# Patient Record
Sex: MALE | Race: WHITE | Employment: FULL TIME | URBAN - METROPOLITAN AREA
[De-identification: names, ages, dates, MRNs, and addresses within clinical notes are randomized per-mention and may not be internally consistent; named-entity substitution may affect disease eponyms.]

---

## 2019-08-01 ENCOUNTER — HOSPITAL ENCOUNTER (EMERGENCY)
Age: 29
Discharge: HOME | End: 2019-08-01
Payer: COMMERCIAL

## 2019-08-01 VITALS
HEART RATE: 96 BPM | OXYGEN SATURATION: 100 % | RESPIRATION RATE: 16 BRPM | DIASTOLIC BLOOD PRESSURE: 83 MMHG | WEIGHT: 153 LBS | BODY MASS INDEX: 22.59 KG/M2 | TEMPERATURE: 98.4 F | SYSTOLIC BLOOD PRESSURE: 156 MMHG

## 2019-08-01 DIAGNOSIS — L05.01 PILONIDAL ABSCESS: ICD-10-CM

## 2019-08-01 DIAGNOSIS — Z51.89 VISIT FOR WOUND CHECK: Primary | ICD-10-CM

## 2019-08-01 PROCEDURE — 99212 OFFICE O/P EST SF 10 MIN: CPT

## 2019-08-01 RX ORDER — CLINDAMYCIN HYDROCHLORIDE 150 MG/1
300 CAPSULE ORAL 4 TIMES DAILY
Qty: 56 CAPSULE | Refills: 0 | Status: SHIPPED | OUTPATIENT
Start: 2019-08-01 | End: 2019-08-08

## 2019-08-01 ASSESSMENT — PAIN SCALES - GENERAL: PAINLEVEL_OUTOF10: 2

## 2019-08-01 ASSESSMENT — PAIN DESCRIPTION - LOCATION: LOCATION: COCCYX

## 2019-08-01 NOTE — ED PROVIDER NOTES
------------------------------------------   I have spoken with the patient and discussed todays results, in addition to providing specific details for the plan of care and counseling regarding the diagnosis and prognosis. Their questions are answered at this time and they are agreeable with the plan.      --------------------------------- ADDITIONAL PROVIDER NOTES ---------------------------------      This patient is stable for discharge. I have shared the specific conditions for return, as well as the importance of follow-up. * NOTE: This report was transcribed using voice recognition software. Every effort was made to ensure accuracy; however, inadvertent computerized transcription errors may be present.    --------------------------------- IMPRESSION AND DISPOSITION ---------------------------------    IMPRESSION  1. Visit for wound check    2.  Pilonidal abscess        DISPOSITION  Disposition: Discharge to home  Patient condition is good         Chapin Ambrocio PA-C  08/01/19 0594

## 2019-08-11 ENCOUNTER — HOSPITAL ENCOUNTER (EMERGENCY)
Age: 29
Discharge: HOME | End: 2019-08-11
Payer: COMMERCIAL

## 2019-08-11 VITALS
HEART RATE: 104 BPM | RESPIRATION RATE: 16 BRPM | OXYGEN SATURATION: 100 % | TEMPERATURE: 98 F | WEIGHT: 153 LBS | BODY MASS INDEX: 22.59 KG/M2 | SYSTOLIC BLOOD PRESSURE: 144 MMHG | DIASTOLIC BLOOD PRESSURE: 82 MMHG

## 2019-08-11 DIAGNOSIS — Z88.9 DRUG ALLERGY: Primary | ICD-10-CM

## 2019-08-11 PROCEDURE — 99212 OFFICE O/P EST SF 10 MIN: CPT

## 2019-08-11 PROCEDURE — 6370000000 HC RX 637 (ALT 250 FOR IP): Performed by: PHYSICIAN ASSISTANT

## 2019-08-11 RX ORDER — PREDNISONE 20 MG/1
40 TABLET ORAL ONCE
Status: COMPLETED | OUTPATIENT
Start: 2019-08-11 | End: 2019-08-11

## 2019-08-11 RX ORDER — PREDNISONE 20 MG/1
TABLET ORAL
Qty: 8 TABLET | Refills: 0 | Status: SHIPPED | OUTPATIENT
Start: 2019-08-11 | End: 2019-08-19

## 2019-08-11 RX ORDER — CLINDAMYCIN HYDROCHLORIDE 300 MG/1
300 CAPSULE ORAL 3 TIMES DAILY
COMMUNITY
End: 2019-08-19

## 2019-08-11 RX ORDER — FAMOTIDINE 20 MG/1
20 TABLET, FILM COATED ORAL ONCE
Status: COMPLETED | OUTPATIENT
Start: 2019-08-11 | End: 2019-08-11

## 2019-08-11 RX ADMIN — PREDNISONE 40 MG: 20 TABLET ORAL at 20:21

## 2019-08-11 RX ADMIN — FAMOTIDINE 20 MG: 20 TABLET ORAL at 20:21

## 2019-08-19 ENCOUNTER — OFFICE VISIT (OUTPATIENT)
Dept: FAMILY MEDICINE CLINIC | Age: 29
End: 2019-08-19
Payer: COMMERCIAL

## 2019-08-19 VITALS
RESPIRATION RATE: 18 BRPM | BODY MASS INDEX: 21.94 KG/M2 | HEART RATE: 106 BPM | OXYGEN SATURATION: 97 % | TEMPERATURE: 98.5 F | WEIGHT: 148.1 LBS | SYSTOLIC BLOOD PRESSURE: 138 MMHG | HEIGHT: 69 IN | DIASTOLIC BLOOD PRESSURE: 60 MMHG

## 2019-08-19 DIAGNOSIS — R09.81 CHRONIC NASAL CONGESTION: ICD-10-CM

## 2019-08-19 DIAGNOSIS — Z76.89 ENCOUNTER TO ESTABLISH CARE: Primary | ICD-10-CM

## 2019-08-19 DIAGNOSIS — R09.89 GLOBUS SENSATION: ICD-10-CM

## 2019-08-19 DIAGNOSIS — L05.91 SACROCOCCYGEAL PILONIDAL CYST: ICD-10-CM

## 2019-08-19 DIAGNOSIS — Z82.49 FH: HEART DISEASE: ICD-10-CM

## 2019-08-19 PROBLEM — R09.A2 GLOBUS SENSATION: Status: ACTIVE | Noted: 2019-08-19

## 2019-08-19 PROCEDURE — 99204 OFFICE O/P NEW MOD 45 MIN: CPT | Performed by: NURSE PRACTITIONER

## 2019-08-19 RX ORDER — PANTOPRAZOLE SODIUM 20 MG/1
20 TABLET, DELAYED RELEASE ORAL
Qty: 30 TABLET | Refills: 1 | Status: SHIPPED | OUTPATIENT
Start: 2019-08-19 | End: 2019-10-03 | Stop reason: SDUPTHER

## 2019-08-19 ASSESSMENT — ENCOUNTER SYMPTOMS
BACK PAIN: 0
SHORTNESS OF BREATH: 0
FACIAL SWELLING: 0
NAUSEA: 0
TROUBLE SWALLOWING: 0
CHEST TIGHTNESS: 0
VOMITING: 0
ABDOMINAL PAIN: 0
DIARRHEA: 0
SINUS PAIN: 0
RHINORRHEA: 0
SINUS PRESSURE: 0
WHEEZING: 0
COLOR CHANGE: 0
COUGH: 0
CONSTIPATION: 0
SORE THROAT: 0
VOICE CHANGE: 0

## 2019-08-19 ASSESSMENT — PATIENT HEALTH QUESTIONNAIRE - PHQ9
2. FEELING DOWN, DEPRESSED OR HOPELESS: 0
SUM OF ALL RESPONSES TO PHQ QUESTIONS 1-9: 0
1. LITTLE INTEREST OR PLEASURE IN DOING THINGS: 0
SUM OF ALL RESPONSES TO PHQ QUESTIONS 1-9: 0
SUM OF ALL RESPONSES TO PHQ9 QUESTIONS 1 & 2: 0

## 2019-08-19 NOTE — PATIENT INSTRUCTIONS
vitamins, supplements, and herbal remedies you take. Some of these can increase the risk of bleeding or interact with anesthesia.     · If you take blood thinners, such as warfarin (Coumadin), clopidogrel (Plavix), or aspirin, be sure to talk to your doctor. He or she will tell you if you should stop taking these medicines before your surgery. Make sure that you understand exactly what your doctor wants you to do.     · Your doctor will tell you which medicines to take or stop before your surgery. You may need to stop taking certain medicines a week or more before surgery. So talk to your doctor as soon as you can.     · If you have an advance directive, let your doctor know. It may include a living will and a durable power of  for health care. Bring a copy to the hospital. If you don't have one, you may want to prepare one. It lets your doctor and loved ones know your health care wishes. Doctors advise that everyone prepare these papers before any type of surgery or procedure. What happens on the day of surgery? · Follow the instructions exactly about when to stop eating and drinking. If you don't, your surgery may be canceled. If your doctor told you to take your medicines on the day of surgery, take them with only a sip of water.     · Take a bath or shower before you come in for your surgery. Do not apply lotions, perfumes, deodorants, or nail polish.     · Do not shave the surgical site yourself.     · Take off all jewelry and piercings. And take out contact lenses, if you wear them.    At the hospital or surgery center   · Bring a picture ID.     · The area for surgery is often marked to make sure there are no errors.     · You will be kept comfortable and safe by your anesthesia provider. The anesthesia may make you sleep. Or it may just numb the area being worked on.     · The surgery will take about 1 hour. Going home   · Be sure you have someone to drive you home.  Anesthesia and pain medicine for you to recover. But each person recovers at a different pace. Follow the steps below to get better as quickly as possible. How can you care for yourself at home? Activity    · Rest when you feel tired. Getting enough sleep will help you recover.     · Try to walk each day. Start by walking a little more than you did the day before. Bit by bit, increase the amount you walk. Walking boosts blood flow and helps prevent pneumonia and constipation.     · Shower as usual. Kerri Pitkin the area around your incision dry with a towel when you are done. Avoid baths until the wound is completely healed. Keep the area dry and clean.     · Ask your doctor when you can drive again.     · Avoid sitting for a long time or sitting on hard surfaces until your incision has healed.     · Most people are able to return to work within 2 to 4 weeks after surgery. Diet    · You can eat your normal diet. If your stomach is upset, try bland, low-fat foods like plain rice, broiled chicken, toast, and yogurt.     · Drink plenty of fluids (unless your doctor tells you not to).     · You may notice that your bowel movements are not regular right after your surgery. This is common. Try to avoid constipation and straining with bowel movements. You may want to take a fiber supplement every day. If you have not had a bowel movement after a couple of days, ask your doctor about taking a mild laxative. Medicines    · Your doctor will tell you if and when you can restart your medicines. He or she will also give you instructions about taking any new medicines.     · If you take blood thinners, such as warfarin (Coumadin), clopidogrel (Plavix), or aspirin, be sure to talk to your doctor. He or she will tell you if and when to start taking those medicines again. Make sure that you understand exactly what your doctor wants you to do.     · Take pain medicines exactly as directed.   ? If the doctor gave you a prescription medicine for pain, take it as not have an account, please click on the \"Sign Up Now\" link. Current as of: November 7, 2018  Content Version: 12.1  © 0367-8530 [employer]. Care instructions adapted under license by [facility] ([facility]). If you have questions about a medical condition or this instruction, always ask your healthcare professional. [address] any warranty or liability for your use of this information. Patient Education        pantoprazole (oral/injection)  Pronunciation:  pan TOE pra zole  Brand:  Protonix  What is the most important information I should know about pantoprazole? Pantoprazole can cause kidney problems. Tell your doctor if you are urinating less than usual, or if you have blood in your urine. Diarrhea may be a sign of a new infection. Call your doctor if you have diarrhea that is watery or has blood in it. Pantoprazole may cause new or worsening symptoms of lupus. Tell your doctor if you have joint pain and a skin rash on your cheeks or arms that worsens in sunlight. You may be more likely to have a broken bone while taking this medicine long term or more than once per day. What is pantoprazole? Pantoprazole is a proton pump inhibitor that decreases the amount of acid produced in the stomach. Pantoprazole is used to treat erosive esophagitis (damage to the esophagus from stomach acid caused by gastroesophageal reflux disease, or GERD) in adults and children who are at least 11years old. Pantoprazole is usually given for up to 8 weeks at a time while your esophagus heals. Pantoprazole is also used to treat Zollinger-Engle syndrome and other conditions involving excess stomach acid. Pantoprazole is not for immediate relief of heartburn. Pantoprazole may also be used for purposes not listed in this medication guide. What should I discuss with my healthcare provider before using pantoprazole? Heartburn can mimic early symptoms of a heart attack.  Get emergency medical

## 2019-08-26 ENCOUNTER — OFFICE VISIT (OUTPATIENT)
Dept: SURGERY | Age: 29
End: 2019-08-26
Payer: COMMERCIAL

## 2019-08-26 VITALS
HEART RATE: 122 BPM | SYSTOLIC BLOOD PRESSURE: 133 MMHG | HEIGHT: 70 IN | DIASTOLIC BLOOD PRESSURE: 87 MMHG | BODY MASS INDEX: 21.33 KG/M2 | RESPIRATION RATE: 16 BRPM | WEIGHT: 149 LBS

## 2019-08-26 DIAGNOSIS — L05.91 PILONIDAL CYST: Primary | ICD-10-CM

## 2019-08-26 PROCEDURE — 99204 OFFICE O/P NEW MOD 45 MIN: CPT | Performed by: SURGERY

## 2019-08-26 NOTE — PROGRESS NOTES
Drug use: No    Sexual activity: Yes     Partners: Female   Lifestyle    Physical activity:     Days per week: Not on file     Minutes per session: Not on file    Stress: Not on file   Relationships    Social connections:     Talks on phone: Not on file     Gets together: Not on file     Attends Sabianism service: Not on file     Active member of club or organization: Not on file     Attends meetings of clubs or organizations: Not on file     Relationship status: Not on file    Intimate partner violence:     Fear of current or ex partner: Not on file     Emotionally abused: Not on file     Physically abused: Not on file     Forced sexual activity: Not on file   Other Topics Concern    Not on file   Social History Narrative    Not on file           Review of Systems  Review of Systems -  General ROS: negative for - chills, fatigue or malaise  ENT ROS: negative for - hearing change, nasal congestion or nasal discharge  Allergy and Immunology ROS: negative for - hives, itchy/watery eyes or nasal congestion  Hematological and Lymphatic ROS: negative for - blood clots, blood transfusions, bruising or fatigue  Endocrine ROS: negative for - malaise/lethargy, mood swings, palpitations or polydipsia/polyuria  Breast ROS: negative for - new or changing breast lumps or nipple changes  Respiratory ROS: negative for - sputum changes, stridor, tachypnea or wheezing  Cardiovascular ROS: negative for - irregular heartbeat, loss of consciousness, murmur or orthopnea  Gastrointestinal ROS: negative for - constipation, diarrhea, gas/bloating, heartburn or hematemesis  Genito-Urinary ROS: negative for -  genital discharge, genital ulcers or hematuria  Musculoskeletal ROS: negative for - gait disturbance, muscle pain or muscular weakness    Physical exam:   /87 (Site: Left Upper Arm, Position: Sitting, Cuff Size: Medium Adult)   Pulse 122   Resp 16   Ht 5' 10\" (1.778 m)   Wt 149 lb (67.6 kg)   BMI 21.38 kg/m²   General

## 2019-09-27 ENCOUNTER — TELEPHONE (OUTPATIENT)
Dept: SURGERY | Age: 29
End: 2019-09-27

## 2019-09-30 ENCOUNTER — PREP FOR PROCEDURE (OUTPATIENT)
Dept: SURGERY | Age: 29
End: 2019-09-30

## 2019-09-30 RX ORDER — SODIUM CHLORIDE 0.9 % (FLUSH) 0.9 %
10 SYRINGE (ML) INJECTION PRN
Status: CANCELLED | OUTPATIENT
Start: 2019-09-30

## 2019-09-30 RX ORDER — SODIUM CHLORIDE, SODIUM LACTATE, POTASSIUM CHLORIDE, CALCIUM CHLORIDE 600; 310; 30; 20 MG/100ML; MG/100ML; MG/100ML; MG/100ML
INJECTION, SOLUTION INTRAVENOUS CONTINUOUS
Status: CANCELLED | OUTPATIENT
Start: 2019-09-30

## 2019-09-30 RX ORDER — SODIUM CHLORIDE 0.9 % (FLUSH) 0.9 %
10 SYRINGE (ML) INJECTION EVERY 12 HOURS SCHEDULED
Status: CANCELLED | OUTPATIENT
Start: 2019-09-30

## 2019-10-01 ENCOUNTER — TELEPHONE (OUTPATIENT)
Dept: SURGERY | Age: 29
End: 2019-10-01

## 2019-10-03 ENCOUNTER — ANESTHESIA EVENT (OUTPATIENT)
Dept: OPERATING ROOM | Age: 29
End: 2019-10-03
Payer: COMMERCIAL

## 2019-10-03 DIAGNOSIS — R09.89 GLOBUS SENSATION: ICD-10-CM

## 2019-10-03 RX ORDER — PANTOPRAZOLE SODIUM 20 MG/1
20 TABLET, DELAYED RELEASE ORAL
Qty: 30 TABLET | Refills: 1 | Status: SHIPPED
Start: 2019-10-03 | End: 2022-03-17 | Stop reason: ALTCHOICE

## 2019-10-04 ENCOUNTER — HOSPITAL ENCOUNTER (OUTPATIENT)
Age: 29
Setting detail: OUTPATIENT SURGERY
Discharge: HOME | End: 2019-10-04
Attending: SURGERY | Admitting: SURGERY
Payer: COMMERCIAL

## 2019-10-04 ENCOUNTER — ANESTHESIA (OUTPATIENT)
Dept: OPERATING ROOM | Age: 29
End: 2019-10-04
Payer: COMMERCIAL

## 2019-10-04 VITALS
TEMPERATURE: 98.3 F | RESPIRATION RATE: 17 BRPM | HEIGHT: 70 IN | OXYGEN SATURATION: 100 % | HEART RATE: 88 BPM | BODY MASS INDEX: 21.19 KG/M2 | WEIGHT: 148 LBS | SYSTOLIC BLOOD PRESSURE: 120 MMHG | DIASTOLIC BLOOD PRESSURE: 72 MMHG

## 2019-10-04 VITALS
RESPIRATION RATE: 14 BRPM | OXYGEN SATURATION: 98 % | DIASTOLIC BLOOD PRESSURE: 60 MMHG | SYSTOLIC BLOOD PRESSURE: 97 MMHG

## 2019-10-04 DIAGNOSIS — L05.91 SACROCOCCYGEAL PILONIDAL CYST: Primary | ICD-10-CM

## 2019-10-04 PROCEDURE — 3700000001 HC ADD 15 MINUTES (ANESTHESIA): Performed by: SURGERY

## 2019-10-04 PROCEDURE — 2500000003 HC RX 250 WO HCPCS: Performed by: SURGERY

## 2019-10-04 PROCEDURE — 3600000012 HC SURGERY LEVEL 2 ADDTL 15MIN: Performed by: SURGERY

## 2019-10-04 PROCEDURE — 88304 TISSUE EXAM BY PATHOLOGIST: CPT

## 2019-10-04 PROCEDURE — 7100000010 HC PHASE II RECOVERY - FIRST 15 MIN: Performed by: SURGERY

## 2019-10-04 PROCEDURE — 6360000002 HC RX W HCPCS: Performed by: NURSE ANESTHETIST, CERTIFIED REGISTERED

## 2019-10-04 PROCEDURE — 7100000011 HC PHASE II RECOVERY - ADDTL 15 MIN: Performed by: SURGERY

## 2019-10-04 PROCEDURE — 11772 EXC PILONIDAL CYST COMP: CPT | Performed by: SURGERY

## 2019-10-04 PROCEDURE — 2709999900 HC NON-CHARGEABLE SUPPLY: Performed by: SURGERY

## 2019-10-04 PROCEDURE — 2580000003 HC RX 258: Performed by: SURGERY

## 2019-10-04 PROCEDURE — 2500000003 HC RX 250 WO HCPCS: Performed by: NURSE ANESTHETIST, CERTIFIED REGISTERED

## 2019-10-04 PROCEDURE — 3700000000 HC ANESTHESIA ATTENDED CARE: Performed by: SURGERY

## 2019-10-04 PROCEDURE — 3600000002 HC SURGERY LEVEL 2 BASE: Performed by: SURGERY

## 2019-10-04 RX ORDER — TRAMADOL HYDROCHLORIDE 50 MG/1
50 TABLET ORAL EVERY 6 HOURS PRN
Qty: 12 TABLET | Refills: 0 | Status: SHIPPED | OUTPATIENT
Start: 2019-10-04 | End: 2019-10-07

## 2019-10-04 RX ORDER — GLYCOPYRROLATE 1 MG/5 ML
SYRINGE (ML) INTRAVENOUS PRN
Status: DISCONTINUED | OUTPATIENT
Start: 2019-10-04 | End: 2019-10-04 | Stop reason: SDUPTHER

## 2019-10-04 RX ORDER — CLINDAMYCIN PHOSPHATE 600 MG/50ML
600 INJECTION INTRAVENOUS
Status: DISCONTINUED | OUTPATIENT
Start: 2019-10-04 | End: 2019-10-04 | Stop reason: HOSPADM

## 2019-10-04 RX ORDER — FENTANYL CITRATE 50 UG/ML
INJECTION, SOLUTION INTRAMUSCULAR; INTRAVENOUS PRN
Status: DISCONTINUED | OUTPATIENT
Start: 2019-10-04 | End: 2019-10-04 | Stop reason: SDUPTHER

## 2019-10-04 RX ORDER — SODIUM CHLORIDE, SODIUM LACTATE, POTASSIUM CHLORIDE, CALCIUM CHLORIDE 600; 310; 30; 20 MG/100ML; MG/100ML; MG/100ML; MG/100ML
INJECTION, SOLUTION INTRAVENOUS CONTINUOUS
Status: DISCONTINUED | OUTPATIENT
Start: 2019-10-04 | End: 2019-10-04 | Stop reason: HOSPADM

## 2019-10-04 RX ORDER — IBUPROFEN 800 MG/1
800 TABLET ORAL EVERY 6 HOURS PRN
Qty: 20 TABLET | Refills: 0 | Status: SHIPPED | OUTPATIENT
Start: 2019-10-04 | End: 2022-03-17 | Stop reason: ALTCHOICE

## 2019-10-04 RX ORDER — SODIUM CHLORIDE 0.9 % (FLUSH) 0.9 %
10 SYRINGE (ML) INJECTION EVERY 12 HOURS SCHEDULED
Status: DISCONTINUED | OUTPATIENT
Start: 2019-10-04 | End: 2019-10-04 | Stop reason: HOSPADM

## 2019-10-04 RX ORDER — BUPIVACAINE HYDROCHLORIDE AND EPINEPHRINE 2.5; 5 MG/ML; UG/ML
INJECTION, SOLUTION EPIDURAL; INFILTRATION; INTRACAUDAL; PERINEURAL PRN
Status: DISCONTINUED | OUTPATIENT
Start: 2019-10-04 | End: 2019-10-04 | Stop reason: ALTCHOICE

## 2019-10-04 RX ORDER — MIDAZOLAM HYDROCHLORIDE 1 MG/ML
INJECTION INTRAMUSCULAR; INTRAVENOUS PRN
Status: DISCONTINUED | OUTPATIENT
Start: 2019-10-04 | End: 2019-10-04 | Stop reason: SDUPTHER

## 2019-10-04 RX ORDER — PROPOFOL 10 MG/ML
INJECTION, EMULSION INTRAVENOUS CONTINUOUS PRN
Status: DISCONTINUED | OUTPATIENT
Start: 2019-10-04 | End: 2019-10-04 | Stop reason: SDUPTHER

## 2019-10-04 RX ORDER — SODIUM CHLORIDE 0.9 % (FLUSH) 0.9 %
10 SYRINGE (ML) INJECTION PRN
Status: DISCONTINUED | OUTPATIENT
Start: 2019-10-04 | End: 2019-10-04 | Stop reason: HOSPADM

## 2019-10-04 RX ADMIN — FENTANYL CITRATE 25 MCG: 50 INJECTION, SOLUTION INTRAMUSCULAR; INTRAVENOUS at 10:12

## 2019-10-04 RX ADMIN — Medication 0.2 MG: at 10:00

## 2019-10-04 RX ADMIN — SODIUM CHLORIDE, POTASSIUM CHLORIDE, SODIUM LACTATE AND CALCIUM CHLORIDE: 600; 310; 30; 20 INJECTION, SOLUTION INTRAVENOUS at 09:33

## 2019-10-04 RX ADMIN — FENTANYL CITRATE 50 MCG: 50 INJECTION, SOLUTION INTRAMUSCULAR; INTRAVENOUS at 09:56

## 2019-10-04 RX ADMIN — FENTANYL CITRATE 25 MCG: 50 INJECTION, SOLUTION INTRAMUSCULAR; INTRAVENOUS at 10:06

## 2019-10-04 RX ADMIN — MIDAZOLAM 2 MG: 1 INJECTION INTRAMUSCULAR; INTRAVENOUS at 09:50

## 2019-10-04 RX ADMIN — PROPOFOL 100 MCG/KG/MIN: 10 INJECTION, EMULSION INTRAVENOUS at 09:56

## 2019-10-04 ASSESSMENT — PULMONARY FUNCTION TESTS
PIF_VALUE: 1
PIF_VALUE: 2
PIF_VALUE: 1
PIF_VALUE: 2
PIF_VALUE: 1
PIF_VALUE: 2
PIF_VALUE: 1
PIF_VALUE: 2
PIF_VALUE: 1
PIF_VALUE: 2
PIF_VALUE: 1
PIF_VALUE: 0
PIF_VALUE: 1

## 2019-10-04 ASSESSMENT — PAIN DESCRIPTION - PROGRESSION
CLINICAL_PROGRESSION: NOT CHANGED
CLINICAL_PROGRESSION: NOT CHANGED

## 2019-10-04 ASSESSMENT — PAIN DESCRIPTION - PAIN TYPE
TYPE: SURGICAL PAIN
TYPE: SURGICAL PAIN

## 2019-10-04 ASSESSMENT — PAIN DESCRIPTION - FREQUENCY
FREQUENCY: CONTINUOUS
FREQUENCY: CONTINUOUS

## 2019-10-04 ASSESSMENT — PAIN DESCRIPTION - DESCRIPTORS
DESCRIPTORS: SORE
DESCRIPTORS: SORE

## 2019-10-04 ASSESSMENT — PAIN - FUNCTIONAL ASSESSMENT
PAIN_FUNCTIONAL_ASSESSMENT: 0-10
PAIN_FUNCTIONAL_ASSESSMENT: PREVENTS OR INTERFERES SOME ACTIVE ACTIVITIES AND ADLS
PAIN_FUNCTIONAL_ASSESSMENT: ACTIVITIES ARE NOT PREVENTED

## 2019-10-04 ASSESSMENT — PAIN SCALES - GENERAL
PAINLEVEL_OUTOF10: 4
PAINLEVEL_OUTOF10: 4

## 2019-10-04 ASSESSMENT — PAIN DESCRIPTION - ONSET
ONSET: ON-GOING
ONSET: ON-GOING

## 2019-10-04 ASSESSMENT — PAIN DESCRIPTION - LOCATION
LOCATION: BUTTOCKS
LOCATION: COCCYX

## 2019-10-04 ASSESSMENT — PAIN DESCRIPTION - ORIENTATION
ORIENTATION: MID
ORIENTATION: MID;UPPER

## 2019-10-14 ENCOUNTER — OFFICE VISIT (OUTPATIENT)
Dept: SURGERY | Age: 29
End: 2019-10-14

## 2019-10-14 VITALS
HEIGHT: 70 IN | HEART RATE: 120 BPM | DIASTOLIC BLOOD PRESSURE: 86 MMHG | WEIGHT: 148 LBS | BODY MASS INDEX: 21.19 KG/M2 | TEMPERATURE: 98.1 F | SYSTOLIC BLOOD PRESSURE: 162 MMHG

## 2019-10-14 DIAGNOSIS — L05.91 PILONIDAL CYST: Primary | ICD-10-CM

## 2019-10-14 PROCEDURE — 99024 POSTOP FOLLOW-UP VISIT: CPT | Performed by: SURGERY

## 2019-10-14 RX ORDER — SULFAMETHOXAZOLE AND TRIMETHOPRIM 800; 160 MG/1; MG/1
1 TABLET ORAL 2 TIMES DAILY
Qty: 10 TABLET | Refills: 0 | Status: SHIPPED | OUTPATIENT
Start: 2019-10-14 | End: 2019-10-19

## 2019-10-14 RX ORDER — IBUPROFEN 800 MG/1
800 TABLET ORAL EVERY 6 HOURS PRN
Qty: 20 TABLET | Refills: 0 | Status: SHIPPED | OUTPATIENT
Start: 2019-10-14 | End: 2019-10-21

## 2019-10-18 ENCOUNTER — HOSPITAL ENCOUNTER (OUTPATIENT)
Age: 29
Discharge: HOME | End: 2019-10-20
Payer: COMMERCIAL

## 2019-10-18 DIAGNOSIS — Z76.89 ENCOUNTER TO ESTABLISH CARE: ICD-10-CM

## 2019-10-18 DIAGNOSIS — Z82.49 FH: HEART DISEASE: ICD-10-CM

## 2019-10-18 LAB
ALBUMIN SERPL-MCNC: 5.1 G/DL (ref 3.5–5.2)
ALP BLD-CCNC: 51 U/L (ref 40–129)
ALT SERPL-CCNC: 27 U/L (ref 0–40)
ANION GAP SERPL CALCULATED.3IONS-SCNC: 16 MMOL/L (ref 7–16)
AST SERPL-CCNC: 18 U/L (ref 0–39)
BASOPHILS ABSOLUTE: 0.05 E9/L (ref 0–0.2)
BASOPHILS RELATIVE PERCENT: 1 % (ref 0–2)
BILIRUB SERPL-MCNC: 0.6 MG/DL (ref 0–1.2)
BUN BLDV-MCNC: 14 MG/DL (ref 6–20)
CALCIUM SERPL-MCNC: 10 MG/DL (ref 8.6–10.2)
CHLORIDE BLD-SCNC: 101 MMOL/L (ref 98–107)
CHOLESTEROL, TOTAL: 161 MG/DL (ref 0–199)
CO2: 24 MMOL/L (ref 22–29)
CREAT SERPL-MCNC: 1 MG/DL (ref 0.7–1.2)
EOSINOPHILS ABSOLUTE: 0.06 E9/L (ref 0.05–0.5)
EOSINOPHILS RELATIVE PERCENT: 1.1 % (ref 0–6)
GFR AFRICAN AMERICAN: >60
GFR NON-AFRICAN AMERICAN: >60 ML/MIN/1.73
GLUCOSE BLD-MCNC: 94 MG/DL (ref 74–99)
HCT VFR BLD CALC: 47.8 % (ref 37–54)
HDLC SERPL-MCNC: 54 MG/DL
HEMOGLOBIN: 15.9 G/DL (ref 12.5–16.5)
IMMATURE GRANULOCYTES #: 0.02 E9/L
IMMATURE GRANULOCYTES %: 0.4 % (ref 0–5)
LDL CHOLESTEROL CALCULATED: 94 MG/DL (ref 0–99)
LYMPHOCYTES ABSOLUTE: 2.18 E9/L (ref 1.5–4)
LYMPHOCYTES RELATIVE PERCENT: 41.4 % (ref 20–42)
MCH RBC QN AUTO: 27.8 PG (ref 26–35)
MCHC RBC AUTO-ENTMCNC: 33.3 % (ref 32–34.5)
MCV RBC AUTO: 83.7 FL (ref 80–99.9)
MONOCYTES ABSOLUTE: 0.36 E9/L (ref 0.1–0.95)
MONOCYTES RELATIVE PERCENT: 6.8 % (ref 2–12)
NEUTROPHILS ABSOLUTE: 2.59 E9/L (ref 1.8–7.3)
NEUTROPHILS RELATIVE PERCENT: 49.3 % (ref 43–80)
PDW BLD-RTO: 11.9 FL (ref 11.5–15)
PLATELET # BLD: 254 E9/L (ref 130–450)
PMV BLD AUTO: 10.9 FL (ref 7–12)
POTASSIUM SERPL-SCNC: 4.9 MMOL/L (ref 3.5–5)
RBC # BLD: 5.71 E12/L (ref 3.8–5.8)
SODIUM BLD-SCNC: 141 MMOL/L (ref 132–146)
TOTAL PROTEIN: 7.9 G/DL (ref 6.4–8.3)
TRIGL SERPL-MCNC: 67 MG/DL (ref 0–149)
VLDLC SERPL CALC-MCNC: 13 MG/DL
WBC # BLD: 5.3 E9/L (ref 4.5–11.5)

## 2019-10-18 PROCEDURE — 36415 COLL VENOUS BLD VENIPUNCTURE: CPT

## 2019-10-18 PROCEDURE — 80053 COMPREHEN METABOLIC PANEL: CPT

## 2019-10-18 PROCEDURE — 85025 COMPLETE CBC W/AUTO DIFF WBC: CPT

## 2019-10-18 PROCEDURE — 80061 LIPID PANEL: CPT

## 2019-10-21 ENCOUNTER — OFFICE VISIT (OUTPATIENT)
Dept: SURGERY | Age: 29
End: 2019-10-21

## 2019-10-21 ENCOUNTER — OFFICE VISIT (OUTPATIENT)
Dept: FAMILY MEDICINE CLINIC | Age: 29
End: 2019-10-21
Payer: COMMERCIAL

## 2019-10-21 VITALS
WEIGHT: 149.6 LBS | HEIGHT: 70 IN | DIASTOLIC BLOOD PRESSURE: 78 MMHG | BODY MASS INDEX: 21.42 KG/M2 | SYSTOLIC BLOOD PRESSURE: 130 MMHG | RESPIRATION RATE: 18 BRPM | HEART RATE: 95 BPM | TEMPERATURE: 98.4 F | OXYGEN SATURATION: 99 %

## 2019-10-21 VITALS
WEIGHT: 149.5 LBS | HEIGHT: 70 IN | SYSTOLIC BLOOD PRESSURE: 141 MMHG | BODY MASS INDEX: 21.4 KG/M2 | OXYGEN SATURATION: 97 % | HEART RATE: 118 BPM | DIASTOLIC BLOOD PRESSURE: 86 MMHG | TEMPERATURE: 97.9 F

## 2019-10-21 DIAGNOSIS — R09.89 GLOBUS SENSATION: Primary | ICD-10-CM

## 2019-10-21 DIAGNOSIS — K21.9 GASTROESOPHAGEAL REFLUX DISEASE WITHOUT ESOPHAGITIS: ICD-10-CM

## 2019-10-21 DIAGNOSIS — L05.91 SACROCOCCYGEAL PILONIDAL CYST: ICD-10-CM

## 2019-10-21 DIAGNOSIS — L05.91 PILONIDAL CYST: Primary | ICD-10-CM

## 2019-10-21 PROCEDURE — 99214 OFFICE O/P EST MOD 30 MIN: CPT | Performed by: NURSE PRACTITIONER

## 2019-10-21 PROCEDURE — 99024 POSTOP FOLLOW-UP VISIT: CPT | Performed by: SURGERY

## 2019-10-21 ASSESSMENT — ENCOUNTER SYMPTOMS
SINUS PRESSURE: 0
RHINORRHEA: 0
COUGH: 0
FACIAL SWELLING: 0
VOICE CHANGE: 0
TROUBLE SWALLOWING: 0
ABDOMINAL PAIN: 0
WHEEZING: 0
BACK PAIN: 0
SORE THROAT: 0
SINUS PAIN: 0
NAUSEA: 0
DIARRHEA: 0
CONSTIPATION: 0
SHORTNESS OF BREATH: 0
VOMITING: 0
COLOR CHANGE: 0
CHEST TIGHTNESS: 0

## 2020-01-27 ENCOUNTER — OFFICE VISIT (OUTPATIENT)
Dept: FAMILY MEDICINE CLINIC | Age: 30
End: 2020-01-27
Payer: COMMERCIAL

## 2020-01-27 VITALS
RESPIRATION RATE: 16 BRPM | WEIGHT: 153 LBS | TEMPERATURE: 97.4 F | HEIGHT: 70 IN | DIASTOLIC BLOOD PRESSURE: 80 MMHG | SYSTOLIC BLOOD PRESSURE: 136 MMHG | OXYGEN SATURATION: 99 % | BODY MASS INDEX: 21.9 KG/M2 | HEART RATE: 90 BPM

## 2020-01-27 PROBLEM — L05.91 SACROCOCCYGEAL PILONIDAL CYST: Status: RESOLVED | Noted: 2019-08-19 | Resolved: 2020-01-27

## 2020-01-27 PROCEDURE — 99213 OFFICE O/P EST LOW 20 MIN: CPT | Performed by: NURSE PRACTITIONER

## 2020-01-27 RX ORDER — PANTOPRAZOLE SODIUM 20 MG/1
20 TABLET, DELAYED RELEASE ORAL
Qty: 30 TABLET | Refills: 1 | Status: CANCELLED | OUTPATIENT
Start: 2020-01-27

## 2020-01-27 ASSESSMENT — PATIENT HEALTH QUESTIONNAIRE - PHQ9
2. FEELING DOWN, DEPRESSED OR HOPELESS: 0
SUM OF ALL RESPONSES TO PHQ9 QUESTIONS 1 & 2: 0
1. LITTLE INTEREST OR PLEASURE IN DOING THINGS: 0
SUM OF ALL RESPONSES TO PHQ QUESTIONS 1-9: 0
SUM OF ALL RESPONSES TO PHQ QUESTIONS 1-9: 0

## 2020-01-27 ASSESSMENT — ENCOUNTER SYMPTOMS
WHEEZING: 0
COLOR CHANGE: 0
SORE THROAT: 0
SINUS PAIN: 0
FACIAL SWELLING: 0
NAUSEA: 0
RHINORRHEA: 0
BACK PAIN: 0
CHEST TIGHTNESS: 0
SINUS PRESSURE: 0
COUGH: 0
VOMITING: 0
VOICE CHANGE: 0
SHORTNESS OF BREATH: 0
DIARRHEA: 0
TROUBLE SWALLOWING: 0
ABDOMINAL PAIN: 0
CONSTIPATION: 0

## 2020-01-27 NOTE — PATIENT INSTRUCTIONS
biopsy, if one is taken. The doctor also can use the tools to stop bleeding or to do other treatments, if needed.     · You will stay at the hospital or surgery center for 1 to 2 hours until the medicine you were given wears off. Going home   · Be sure you have someone to drive you home. Anesthesia and pain medicine make it unsafe for you to drive.     · You will be given more specific instructions about recovering from your procedure. They will cover things like diet, wound care, follow-up care, driving, and getting back to your normal routine. When should you call your doctor? · You have questions or concerns.     · You don't understand how to prepare for your procedure.     · You become ill before the procedure (such as fever, flu, or a cold).     · You need to reschedule or have changed your mind about having the procedure. Where can you learn more? Go to [email or web address] org and sign in to your [employer] account. Enter P790 in the [employer] box to learn more about \"Upper GI Endoscopy: Before Your Procedure. \"     If you do not have an account, please click on the \"Sign Up Now\" link. Current as of: August 11, 2019  Content Version: 12.3  © 2282-9481 Health[employer], [employer]. Care instructions adapted under license by [facility] ([facility]). If you have questions about a medical condition or this instruction, always ask your healthcare professional. [place] [address] any warranty or liability for your use of this information. Patient Education        Upper GI Endoscopy: What to Expect at Home  Your Recovery  After you have an endoscopy, you will stay at the hospital or clinic for 1 to 2 hours. This will allow the medicine to wear off. You will be able to go home after your doctor or nurse checks to make sure you are not having any problems.   You may have to stay overnight if you had treatment during the test. You may have a sore throat for a day or two after the test.  This care sheet gives you a general idea about what to expect after the test.  How can you care for yourself at home? Activity  · Rest as much as you need to after you go home. · You should be able to go back to your usual activities the day after the test.  Diet  · Follow your doctor's directions for eating after the test.  · Drink plenty of fluids (unless your doctor has told you not to). Medications  · If you have a sore throat the day after the test, use an over-the-counter spray to numb your throat. Follow-up care is a key part of your treatment and safety. Be sure to make and go to all appointments, and call your doctor if you are having problems. It's also a good idea to know your test results and keep a list of the medicines you take. When should you call for help? Call 911 anytime you think you may need emergency care. For example, call if:    · You passed out (loses consciousness).     · You have trouble breathing.     · You pass maroon or bloody stools.    Call your doctor now or seek immediate medical care if:    · You have pain that does not get better after your take pain medicine.     · You have new or worse belly pain.     · You have blood in your stools.     · You are sick to your stomach and cannot keep fluids down.     · You have a fever.     · You cannot pass stools or gas.    Watch closely for changes in your health, and be sure to contact your doctor if:    · Your throat still hurts after a day or two.     · You do not get better as expected. Where can you learn more? Go to [email or web address]://[email or web address].[email or web address] org and sign in to your [employer] account. Enter Q169 in the [facility] box to learn more about \"Upper GI Endoscopy: What to Expect at Home. \"     If you do not have an account, please click on the \"Sign Up Now\" link. Current as of: August 11, 2019  Content Version: 12.3  © 4622-6884 Healthwise, Incorporated.  Care instructions adapted under license by [facility] ([facility]). If you have questions about a medical condition or this instruction, always ask your healthcare professional. [place] [address] any warranty or liability for your use of this information. Patient Education        Gastroesophageal Reflux Disease (GERD): Care Instructions  Your Care Instructions    Gastroesophageal reflux disease (GERD) is the backward flow of stomach acid into the esophagus. The esophagus is the tube that leads from your throat to your stomach. A one-way valve prevents the stomach acid from moving up into this tube. When you have GERD, this valve does not close tightly enough. If you have mild GERD symptoms including heartburn, you may be able to control the problem with antacids or over-the-counter medicine. Changing your diet, losing weight, and making other lifestyle changes can also help reduce symptoms. Follow-up care is a key part of your treatment and safety. Be sure to make and go to all appointments, and call your doctor if you are having problems. It's also a good idea to know your test results and keep a list of the medicines you take. How can you care for yourself at home? · Take your medicines exactly as prescribed. Call your doctor if you think you are having a problem with your medicine. · Your doctor may recommend over-the-counter medicine. For mild or occasional indigestion, antacids, such as Tums, Gaviscon, Mylanta, or Maalox, may help. Your doctor also may recommend over-the-counter acid reducers, such as Pepcid AC, Tagamet HB, Zantac 75, or Prilosec. Read and follow all instructions on the label. If you use these medicines often, talk with your doctor. · Change your eating habits. ? It's best to eat several small meals instead of two or three large meals. ? After you eat, wait 2 to 3 hours before you lie down. ? Chocolate, mint, and alcohol can make GERD worse. ?  Spicy foods, foods that have a lot of acid (like tomatoes and

## 2020-01-27 NOTE — PROGRESS NOTES
OFFICE PROGRESS NOTE  [address] [place] [address] [address]  [id number]  Dept: [phone]   Chief Complaint   Patient presents with    Gastroesophageal Reflux     Here for a routine follow up. Reports symptoms vary based on what he eats. HPI:     GERD: Patient complains of heartburn depending on what he is eating, fried foods, certain oils such as palm oil. This has been associated with heartburn and globus intermittently. He denies no other symptoms. Symptoms have been present for several months. He denies dysphagia. He has not lost weight. He denies melena, hematochezia, hematemesis, and coffee ground emesis. Medical therapy in the past has included proton pump inhibitors he has been out of medication for a month he feels the Pepcid worked better, today has some sinus drainage. He has intermittent globus sensation and would like to see GI. He had surgery for sacrococcygeal cyst which was well healed. He went snow boarding and fell a couple of times. His wife checked it before he left and said it was healed. Now he has small opening but no drainage and he was using a 4x4 but thought maybe it was making it worse.        Current Outpatient Medications:     ibuprofen (ADVIL;MOTRIN) 800 MG tablet, Take 1 tablet by mouth every 6 hours as needed for Pain, Disp: 20 tablet, Rfl: 0    pantoprazole (PROTONIX) 20 MG tablet, Take 1 tablet by mouth every morning (before breakfast), Disp: 30 tablet, Rfl: 1  Social History     Socioeconomic History    Marital status:      Spouse name: None    Number of children: None    Years of education: None    Highest education level: None   Occupational History    None   Social Needs    Financial resource strain: None    Food insecurity:     Worry: None     Inability: None    Transportation needs:     Medical: None     Non-medical: None   Tobacco Use    Smoking status: Never Smoker    Smokeless tobacco: Never Used   Substance and Sexual Activity    Alcohol use: Yes     Alcohol/week: 1.0 standard drinks     Types: 1 Cans of beer per week     Comment: every couple of weeks    Drug use: No    Sexual activity: Yes     Partners: Female   Lifestyle    Physical activity:     Days per week: None     Minutes per session: None    Stress: None   Relationships    Social connections:     Talks on phone: None     Gets together: None     Attends Confucianist service: None     Active member of club or organization: None     Attends meetings of clubs or organizations: None     Relationship status: None    Intimate partner violence:     Fear of current or ex partner: None     Emotionally abused: None     Physically abused: None     Forced sexual activity: None   Other Topics Concern    None   Social History Narrative    None       I have reviewed Jerzy's allergies, medications, problem list, medical, social and family history and have updated as needed in the electronic medical record    Review of Systems   Constitutional: Negative for activity change, appetite change, chills, diaphoresis, fatigue, fever and unexpected weight change. HENT: Negative for congestion, dental problem, drooling, ear discharge, ear pain, facial swelling, hearing loss, mouth sores, nosebleeds, postnasal drip, rhinorrhea, sinus pressure, sinus pain, sneezing, sore throat, tinnitus, trouble swallowing and voice change. Eyes: Negative for visual disturbance. Respiratory: Negative for cough, chest tightness, shortness of breath and wheezing. Cardiovascular: Negative for chest pain, palpitations and leg swelling. Gastrointestinal: Negative for abdominal pain, constipation, diarrhea, nausea and vomiting. Intermittent globus, heartburn   Endocrine: Negative for cold intolerance, heat intolerance, polydipsia, polyphagia and polyuria. Genitourinary: Negative for difficulty urinating, frequency and urgency.    Musculoskeletal: Negative for bruits. No edema  Pulmonary/Chest: clear to auscultation bilaterally, no wheezes, rales or rhonchi, normal air movement, no respiratory distress  Abdomen: soft, non-tender, non-distended, normal bowel sounds, no masses or hepatosplenomegaly  Musculoskeletal: Normal ROM, no joint swelling, deformity or tenderness   Neurologic: reflexes normal and symmetric, no cranial nerve deficit, gait, coordination and speech normal  Extremities: no clubbing, cyanosis, or edema. Psychiatric: Good eye contact, normal mood and affect, answers questions appropriately    ASSESSMENT/PLAN   Carlton was seen today for gastroesophageal reflux. Diagnoses and all orders for this visit:    Globus sensation GERD  Continue Pepcid as needed  Will check to see who is covered by the insurance for EGD  -Discussed elevated the HOB 30 degrees  -Discussed limiting spicy, fatty, greasy foods  -Discussed limit caffeine consumption to 1 - 2 cups daily  -Discussed waiting at least 3 - 4 hours before going to bed after eating  -Discussed not to eat and bend over immediately or lift heavy items.  -Discussed weight reduction if needed even 5 - 10 pounds.  -Discussed taking medications 1 hour prior to eating. Sacrococcygeal pilonidal cyst   Message sent to Dr Almita Powell  Avoid sitting so the buttocks are     Need for DTP RX given            Return in about 3 months (around 4/27/2020) for GERD. Discussed exercising 30 minutes daily and Discussed taking medications as directed and adverse effects        I have reviewed my findings and recommendations with Ilene Boston.     Jesus Mitchell, NP-C, FNP-BC

## 2020-08-26 ENCOUNTER — HOSPITAL ENCOUNTER (OUTPATIENT)
Age: 30
Discharge: HOME | End: 2020-08-28
Payer: COMMERCIAL

## 2020-08-26 ENCOUNTER — NURSE ONLY (OUTPATIENT)
Dept: PRIMARY CARE CLINIC | Age: 30
End: 2020-08-26

## 2020-08-26 PROCEDURE — U0003 INFECTIOUS AGENT DETECTION BY NUCLEIC ACID (DNA OR RNA); SEVERE ACUTE RESPIRATORY SYNDROME CORONAVIRUS 2 (SARS-COV-2) (CORONAVIRUS DISEASE [COVID-19]), AMPLIFIED PROBE TECHNIQUE, MAKING USE OF HIGH THROUGHPUT TECHNOLOGIES AS DESCRIBED BY CMS-2020-01-R: HCPCS

## 2020-08-28 LAB
SARS-COV-2: NOT DETECTED
SOURCE: NORMAL

## 2020-11-23 ENCOUNTER — OFFICE VISIT (OUTPATIENT)
Dept: PRIMARY CARE CLINIC | Age: 30
End: 2020-11-23
Payer: COMMERCIAL

## 2020-11-23 VITALS
TEMPERATURE: 98.4 F | HEIGHT: 70 IN | RESPIRATION RATE: 16 BRPM | HEART RATE: 89 BPM | BODY MASS INDEX: 22.19 KG/M2 | DIASTOLIC BLOOD PRESSURE: 72 MMHG | WEIGHT: 155 LBS | OXYGEN SATURATION: 94 % | SYSTOLIC BLOOD PRESSURE: 138 MMHG

## 2020-11-23 LAB
Lab: ABNORMAL
QC PASS/FAIL: ABNORMAL
SARS-COV-2, POC: DETECTED

## 2020-11-23 PROCEDURE — 99213 OFFICE O/P EST LOW 20 MIN: CPT | Performed by: NURSE PRACTITIONER

## 2020-11-23 PROCEDURE — 87426 SARSCOV CORONAVIRUS AG IA: CPT | Performed by: NURSE PRACTITIONER

## 2020-11-23 NOTE — PATIENT INSTRUCTIONS
Patient Education        Learning About Coronavirus [phone])  Coronavirus [phone]): Overview  What is coronavirus ([id number])? The coronavirus disease (COVID-19) is caused by a virus. It is an illness that was first found in December 2019. It has since spread worldwide. The virus can cause fever, cough, and trouble breathing. In severe cases, it can cause pneumonia and make it hard to breathe without help. It can cause death. This virus spreads person-to-person through droplets from coughing and sneezing. It can also spread when you are close to someone who is infected. And it can spread when you touch something that has the virus on it, such as a doorknob or a tabletop. Coronaviruses are a large group of viruses. They cause the common cold. They also cause more serious illnesses like Middle East respiratory syndrome (MERS) and severe acute respiratory syndrome (SARS). COVID-19 is caused by a novel coronavirus. That means it's a new type that has not been seen in people before. How is COVID-19 treated? Mild illness can be treated at home, but more serious illness needs to be treated in the hospital. Treatment may include medicines to reduce symptoms, plus breathing support such as oxygen therapy or a ventilator. Other treatments, such as antiviral medicines, may help people who have COVID-19. What can you do to protect yourself from COVID-19? The best way to protect yourself from getting sick is to:  · Avoid areas where there is an outbreak. · Avoid contact with people who may be infected. · Avoid crowds and try to stay at least 6 feet away from other people. · Wash your hands often, especially after you cough or sneeze. Use soap and water, and scrub for at least 20 seconds. If soap and water aren't available, use an alcohol-based hand . · Avoid touching your mouth, nose, and eyes. What can you do to avoid spreading the virus to others?   To help avoid spreading the virus to others:  · [employer] clearly. · Your face and lips have a blue color. · You passed out (lost consciousness) or are very hard to wake up. Call your doctor now if you develop symptoms such as:  · Shortness of breath. · Fever. · Cough. If you need to get care, call ahead to the doctor's office for instructions before you go. Make sure you wear a face cover to prevent exposing other people to the virus. Where can you get the latest information? The following health organizations are tracking and studying this virus. Their websites contain the most up-to-date information. Rebecca Anger also learn what to do if you think you may have been exposed to the virus. · U.S. Centers for Disease Control and Prevention (CDC): The CDC provides updated news about the disease and travel advice. The website also tells you how to prevent the spread of infection. www.cdc.gov  · World Health Organization [facility]): WHO offers information about the virus outbreaks. WHO also has travel advice. www.who.int  Current as of: July 10, 2020               Content Version: 12.6  © 2006-2020 [email or web address]. Care instructions adapted under license by [facility] ([facility]). If you have questions about a medical condition or this instruction, always ask your healthcare professional. [address] any warranty or liability for your use of this information.

## 2020-11-23 NOTE — PROGRESS NOTES
Mother     Hypothyroidism Mother     Hypotension Father     Cancer Father         squamous cell skin cancer    High Blood Pressure Brother     Obesity Brother     Stroke Maternal Grandmother     Diabetes Maternal Grandmother     Other Maternal Grandmother         blood clots    Diabetes Maternal Grandfather     Heart Disease Maternal Grandfather     Cancer Maternal Grandfather         skin    Kidney Disease Maternal Grandfather     Heart Disease Paternal Grandmother     Prostate Cancer Paternal Grandfather        Medications:     Current Outpatient Medications:     ibuprofen (ADVIL;MOTRIN) 800 MG tablet, Take 1 tablet by mouth every 6 hours as needed for Pain (Patient not taking: Reported on 11/23/2020), Disp: 20 tablet, Rfl: 0    pantoprazole (PROTONIX) 20 MG tablet, Take 1 tablet by mouth every morning (before breakfast), Disp: 30 tablet, Rfl: 1    Allergies: Allergies   Allergen Reactions    Penicillins      Dad and grandfather is allergic    Prelone [Prednisolone Anhydrous]      hallucinations    Cleocin [Clindamycin Hcl] Rash       Social History:     Social History     Tobacco Use    Smoking status: Never Smoker    Smokeless tobacco: Never Used   Substance Use Topics    Alcohol use: Yes     Alcohol/week: 1.0 standard drinks     Types: 1 Cans of beer per week     Comment: every couple of weeks    Drug use: No       Patient lives at home. Physical Exam:     Vitals:    11/23/20 0938   BP: 138/72   Site: Left Upper Arm   Position: Sitting   Cuff Size: Medium Adult   Pulse: 89   Resp: 16   Temp: 98.4 °F (36.9 °C)   TempSrc: Temporal   SpO2: 94%   Weight: 155 lb (70.3 kg)   Height: 5' 10\" (1.778 m)       Physical Exam (PE)    Physical Exam  Constitutional:       Appearance: Normal appearance. HENT:      Head: Normocephalic.       Right Ear: Tympanic membrane, ear canal and external ear normal.      Left Ear: Tympanic membrane, ear canal and external ear normal.      Nose: Congestion and rhinorrhea present. Mouth/Throat:      Mouth: Mucous membranes are moist.      Pharynx: Oropharynx is clear. Posterior oropharyngeal erythema present. Eyes:      Pupils: Pupils are equal, round, and reactive to light. Neck:      Musculoskeletal: Normal range of motion and neck supple. Cardiovascular:      Rate and Rhythm: Normal rate and regular rhythm. Pulses: Normal pulses. Heart sounds: Normal heart sounds. Pulmonary:      Effort: Pulmonary effort is normal. No respiratory distress. Breath sounds: Normal breath sounds. No wheezing, rhonchi or rales. Abdominal:      General: Bowel sounds are normal.      Palpations: Abdomen is soft. Musculoskeletal: Normal range of motion. Lymphadenopathy:      Cervical: No cervical adenopathy. Skin:     General: Skin is warm and dry. Capillary Refill: Capillary refill takes less than 2 seconds. Neurological:      General: No focal deficit present. Mental Status: He is alert and oriented to person, place, and time. Psychiatric:         Mood and Affect: Mood normal.         Behavior: Behavior normal.          Testing:   (All laboratory and radiology results have been personally reviewed by myself)  Labs:  Results for orders placed or performed in visit on 11/23/20   POCT COVID-19, Antigen   Result Value Ref Range    SARS-COV-2, POC Detected Not Detected    Lot Number [id number]     QC Pass/Fail pass        Imaging: All Radiology results interpreted by Radiologist unless otherwise noted. No orders to display       Assessment / Plan:   The patient's vitals, allergies, medications, and past medical history have been reviewed. Tiny Hess was seen today for fever. Diagnoses and all orders for this visit:    COVID-19  -     POCT COVID-19, Antigen        - Discussed the benefits of daily vitamin C 1 g and zinc 50 mg for immune support.   Advised the patient to use over-the-counter Robitussin-DM as needed for cough and congestion.    - Advised cautionary self-quarantine at home in the interim per CDC guidelines. Increase fluids and rest. Symptomatic relief discussed including Tylenol prn pain/fever. Schedule f/u with PCP in 2-3 days. Red flag symptoms were discussed with the patient today. The patient is directed to go the ED if symptoms worsen or change. Pt verbalizes understanding and is in agreement with plan of care. All questions answered.     SIGNATURE: Jorge Newman, FREEMAN-CNP

## 2020-12-02 ENCOUNTER — E-VISIT (OUTPATIENT)
Dept: FAMILY MEDICINE CLINIC | Age: 30
End: 2020-12-02
Payer: COMMERCIAL

## 2020-12-02 PROCEDURE — 98970 NQHP OL DIG ASSMT&MGMT 5-10: CPT | Performed by: NURSE PRACTITIONER

## 2020-12-02 ASSESSMENT — LIFESTYLE VARIABLES: SMOKING_STATUS: NO, I HAVE NEVER SMOKED

## 2020-12-02 NOTE — PROGRESS NOTES
Spoke with patient he is requesting paperwork for sick leave  and to return to work. Due to + Covid he has been working on the [employer] for months. Developed symptoms 11/20/20 and first day off work 11/24/2020 and would like to RTW 12/4/2020. He is feeling better. As soon as I get the paperwork will fax back to Employee Health.

## 2022-03-17 ENCOUNTER — APPOINTMENT (OUTPATIENT)
Dept: GENERAL RADIOLOGY | Age: 32
End: 2022-03-17
Payer: COMMERCIAL

## 2022-03-17 ENCOUNTER — HOSPITAL ENCOUNTER (EMERGENCY)
Age: 32
Discharge: HOME | End: 2022-03-17
Payer: COMMERCIAL

## 2022-03-17 VITALS
OXYGEN SATURATION: 98 % | WEIGHT: 155 LBS | HEART RATE: 109 BPM | DIASTOLIC BLOOD PRESSURE: 82 MMHG | SYSTOLIC BLOOD PRESSURE: 134 MMHG | RESPIRATION RATE: 18 BRPM | BODY MASS INDEX: 22.19 KG/M2 | HEIGHT: 70 IN | TEMPERATURE: 100.4 F

## 2022-03-17 DIAGNOSIS — J06.9 UPPER RESPIRATORY TRACT INFECTION, UNSPECIFIED TYPE: Primary | ICD-10-CM

## 2022-03-17 PROCEDURE — 6370000000 HC RX 637 (ALT 250 FOR IP): Performed by: PHYSICIAN ASSISTANT

## 2022-03-17 PROCEDURE — 71046 X-RAY EXAM CHEST 2 VIEWS: CPT

## 2022-03-17 PROCEDURE — 99211 OFF/OP EST MAY X REQ PHY/QHP: CPT

## 2022-03-17 RX ORDER — BENZONATATE 100 MG/1
100 CAPSULE ORAL 3 TIMES DAILY PRN
Qty: 30 CAPSULE | Refills: 0 | Status: SHIPPED | OUTPATIENT
Start: 2022-03-17 | End: 2022-03-24

## 2022-03-17 RX ORDER — AZITHROMYCIN 250 MG/1
TABLET, FILM COATED ORAL
Qty: 1 PACKET | Refills: 0 | Status: SHIPPED | OUTPATIENT
Start: 2022-03-17 | End: 2022-03-21

## 2022-03-17 RX ORDER — IBUPROFEN 400 MG/1
400 TABLET ORAL ONCE
Status: COMPLETED | OUTPATIENT
Start: 2022-03-17 | End: 2022-03-17

## 2022-03-17 RX ORDER — GUAIFENESIN 600 MG/1
600 TABLET, EXTENDED RELEASE ORAL 2 TIMES DAILY
Qty: 30 TABLET | Refills: 0 | Status: SHIPPED | OUTPATIENT
Start: 2022-03-17 | End: 2022-04-01

## 2022-03-17 RX ORDER — AZITHROMYCIN 250 MG/1
250 TABLET, FILM COATED ORAL ONCE
Status: COMPLETED | OUTPATIENT
Start: 2022-03-17 | End: 2022-03-17

## 2022-03-17 RX ORDER — ACETAMINOPHEN 500 MG
1000 TABLET ORAL EVERY 6 HOURS PRN
COMMUNITY

## 2022-03-17 RX ADMIN — AZITHROMYCIN MONOHYDRATE 250 MG: 250 TABLET ORAL at 09:23

## 2022-03-17 RX ADMIN — IBUPROFEN 400 MG: 400 TABLET, FILM COATED ORAL at 09:23

## 2022-03-17 ASSESSMENT — PAIN DESCRIPTION - FREQUENCY: FREQUENCY: CONTINUOUS

## 2022-03-17 ASSESSMENT — PAIN SCALES - GENERAL
PAINLEVEL_OUTOF10: 5
PAINLEVEL_OUTOF10: 5

## 2022-03-17 ASSESSMENT — PAIN DESCRIPTION - LOCATION: LOCATION: HEAD

## 2022-03-17 ASSESSMENT — PAIN DESCRIPTION - ONSET: ONSET: GRADUAL

## 2022-03-17 ASSESSMENT — PAIN DESCRIPTION - DESCRIPTORS: DESCRIPTORS: PRESSURE;HEADACHE

## 2022-03-17 ASSESSMENT — PAIN DESCRIPTION - PAIN TYPE: TYPE: ACUTE PAIN

## 2022-03-17 ASSESSMENT — PAIN - FUNCTIONAL ASSESSMENT: PAIN_FUNCTIONAL_ASSESSMENT: 0-10

## 2022-03-17 ASSESSMENT — PAIN DESCRIPTION - PROGRESSION: CLINICAL_PROGRESSION: GRADUALLY WORSENING

## 2022-03-17 NOTE — Clinical Note
Beatriz Rutledge was seen and treated in our emergency department on 3/17/2022. He may return to work on 03/22/2022. May return sooner should symptoms improve. If you have any questions or concerns, please don't hesitate to call.       SHERICE Cortes

## 2022-03-17 NOTE — ED PROVIDER NOTES
HPI:  3/17/22, Time: 8:24 AM EDT         Nevin Salmeron is a 28 y.o. male presenting to the ED for cough, congestion, rhinorrhea, beginning 4days ago. The complaint has been persistent, moderate in severity, and worsened by nothing. Patient reports that he started with head congestion rhinorrhea however the last several days started with cough and chest congestion as well. Did notice a low-grade fever today. Denies any chest pain or difficulty breathing. Had Covid in January 2022 although significantly improved after having this. Patient reports that his girlfriend is sick with similar symptoms but not quite as severe as it is. Denies any recent travel or prolonged immobilization. Patient denies all other symptoms at this time. Review of Systems:   A complete review of systems was performed and pertinent positives and negatives are stated within HPI, all other systems reviewed and are negative.          --------------------------------------------- PAST HISTORY ---------------------------------------------  Past Medical History:  has a past medical history of Allergic rhinitis, Asthma, exercise induced, Osteoarthritis, and Sacrococcygeal pilonidal cyst.    Past Surgical History:  has a past surgical history that includes Tonsillectomy; Tonsillectomy and adenoidectomy; [place] tooth extraction; fracture surgery (Right); and Pilonidal cyst excision (N/A, 10/4/2019). Social History:  reports that he has never smoked. He has never used smokeless tobacco. He reports current alcohol use of about 1.0 standard drink of alcohol per week. He reports that he does not use drugs.     Family History: family history includes Cancer in his father and maternal grandfather; Diabetes in his maternal grandfather and maternal grandmother; Heart Disease in his maternal grandfather and paternal grandmother; High Blood Pressure in his brother and mother; High Cholesterol in his mother; Hypotension in his father; Hypothyroidism in his mother; Kidney Disease in his maternal grandfather; Obesity in his brother; Other in his maternal grandmother; Prostate Cancer in his paternal grandfather; Stroke in his maternal grandmother. The patients home medications have been reviewed. Allergies: Penicillins, Prelone [prednisolone anhydrous], and Cleocin [clindamycin hcl]    -------------------------------------------------- RESULTS -------------------------------------------------  All laboratory and radiology results have been personally reviewed by myself   LABS:  No results found for this visit on 03/17/22. RADIOLOGY:  Interpreted by Radiologist.  XR CHEST (2 VW)   Final Result   No acute pulmonary process             ------------------------- NURSING NOTES AND VITALS REVIEWED ---------------------------   The nursing notes within the ED encounter and vital signs as below have been reviewed. /82   Pulse 109   Temp 100.4 °F (38 °C) (Infrared)   Resp 18   Ht 5' 10\" (1.778 m)   Wt 155 lb (70.3 kg)   SpO2 98%   BMI 22.24 kg/m²   Oxygen Saturation Interpretation: Normal      ---------------------------------------------------PHYSICAL EXAM--------------------------------------      Constitutional/General: Alert and oriented x3, well appearing, non toxic in NAD  Head: Normocephalic and atraumatic. TMs within normal limits bilaterally without erythema or bulging. Eyes: PERRL, EOMI  Mouth: Oropharynx clear, handling secretions, no trismus. Postnasal drip, mild pharyngeal erythema, no peritonsillar swelling, uvula midline. Neck: Supple, full ROM,   Pulmonary: Lungs clear to auscultation bilaterally, no wheezes, rales, or rhonchi. Not in respiratory distress  Cardiovascular:  Regular rate and rhythm, no murmurs, gallops, or rubs. 2+ distal pulses  Abdomen: Soft, non tender, non distended,   Extremities: Moves all extremities x 4.  Warm and well perfused  Skin: warm and dry without rash  Neurologic: GCS 15,  Psych: Normal Affect      ------------------------------ ED COURSE/MEDICAL DECISION MAKING----------------------  Medications   ibuprofen (ADVIL;MOTRIN) tablet 400 mg (400 mg Oral Given 3/17/22 0923)   azithromycin (ZITHROMAX) tablet 250 mg (250 mg Oral Given 3/17/22 0923)         ED COURSE:  ED Course as of 03/17/22 0925   u Mar 17, 2022   0917 Reassessed patient. Remained stable neurovascularly intact. Discussed results with the patient. No acute pathology noted on chest x-ray done today. Patient is minimally tachycardic however he is slightly febrile otherwise vitals are stable. At this time patient is nontoxic-appearing and in no acute distress. We will plan on outpatient symptom management as well as antibiotics secondary to the fever. Offered Covid testing however he declines as he did recently have Covid in January and his home tests that he can take if needed. Denies any chest pain or shortness of breath. Advised follow-up with PCP for recheck return to the emergency department with any new or worsening symptoms. Patient voiced understanding and is agreeable to the above treatment plan. [MS]      ED Course User Index  [MS] [place], [place]       Medical Decision Making:    See ED course above. Counseling: The emergency provider has spoken with the patient and discussed todays results, in addition to providing specific details for the plan of care and counseling regarding the diagnosis and prognosis. Questions are answered at this time and they are agreeable with the plan.      --------------------------------- IMPRESSION AND DISPOSITION ---------------------------------    IMPRESSION  1. Upper respiratory tract infection, unspecified type        DISPOSITION  Disposition: Discharge to home  Patient condition is stable      NOTE: This report was transcribed using voice recognition software.  Every effort was made to ensure accuracy; however, inadvertent computerized transcription errors may be present        Ricardo Hillamn [place]  03/17/22 2298

## 2022-04-20 ENCOUNTER — OFFICE VISIT (OUTPATIENT)
Dept: FAMILY MEDICINE CLINIC | Age: 32
End: 2022-04-20
Payer: COMMERCIAL

## 2022-04-20 VITALS
RESPIRATION RATE: 16 BRPM | TEMPERATURE: 98 F | HEIGHT: 70 IN | BODY MASS INDEX: 22.05 KG/M2 | SYSTOLIC BLOOD PRESSURE: 138 MMHG | WEIGHT: 154 LBS | OXYGEN SATURATION: 100 % | HEART RATE: 86 BPM | DIASTOLIC BLOOD PRESSURE: 82 MMHG

## 2022-04-20 DIAGNOSIS — Z00.00 ANNUAL PHYSICAL EXAM: Primary | ICD-10-CM

## 2022-04-20 DIAGNOSIS — Z00.00 ANNUAL PHYSICAL EXAM: ICD-10-CM

## 2022-04-20 DIAGNOSIS — Z72.51 HIGH RISK HETEROSEXUAL BEHAVIOR: ICD-10-CM

## 2022-04-20 LAB
ALBUMIN SERPL-MCNC: 5 G/DL (ref 3.5–5.2)
ALP BLD-CCNC: 58 U/L (ref 40–129)
ALT SERPL-CCNC: 32 U/L (ref 0–40)
ANION GAP SERPL CALCULATED.3IONS-SCNC: 15 MMOL/L (ref 7–16)
AST SERPL-CCNC: 27 U/L (ref 0–39)
BASOPHILS ABSOLUTE: 0.03 E9/L (ref 0–0.2)
BASOPHILS RELATIVE PERCENT: 0.7 % (ref 0–2)
BILIRUB SERPL-MCNC: 0.5 MG/DL (ref 0–1.2)
BUN BLDV-MCNC: 21 MG/DL (ref 6–20)
CALCIUM SERPL-MCNC: 9.8 MG/DL (ref 8.6–10.2)
CHLORIDE BLD-SCNC: 102 MMOL/L (ref 98–107)
CHOLESTEROL, TOTAL: 194 MG/DL (ref 0–199)
CO2: 23 MMOL/L (ref 22–29)
CREAT SERPL-MCNC: 0.9 MG/DL (ref 0.7–1.2)
EOSINOPHILS ABSOLUTE: 0.03 E9/L (ref 0.05–0.5)
EOSINOPHILS RELATIVE PERCENT: 0.7 % (ref 0–6)
GFR AFRICAN AMERICAN: >60
GFR NON-AFRICAN AMERICAN: >60 ML/MIN/1.73
GLUCOSE BLD-MCNC: 90 MG/DL (ref 74–99)
HCT VFR BLD CALC: 48.9 % (ref 37–54)
HDLC SERPL-MCNC: 65 MG/DL
HEMOGLOBIN: 16.2 G/DL (ref 12.5–16.5)
IMMATURE GRANULOCYTES #: 0.02 E9/L
IMMATURE GRANULOCYTES %: 0.5 % (ref 0–5)
LDL CHOLESTEROL CALCULATED: 117 MG/DL (ref 0–99)
LYMPHOCYTES ABSOLUTE: 1.75 E9/L (ref 1.5–4)
LYMPHOCYTES RELATIVE PERCENT: 40.6 % (ref 20–42)
MCH RBC QN AUTO: 28.1 PG (ref 26–35)
MCHC RBC AUTO-ENTMCNC: 33.1 % (ref 32–34.5)
MCV RBC AUTO: 84.9 FL (ref 80–99.9)
MONOCYTES ABSOLUTE: 0.25 E9/L (ref 0.1–0.95)
MONOCYTES RELATIVE PERCENT: 5.8 % (ref 2–12)
NEUTROPHILS ABSOLUTE: 2.23 E9/L (ref 1.8–7.3)
NEUTROPHILS RELATIVE PERCENT: 51.7 % (ref 43–80)
PDW BLD-RTO: 12.5 FL (ref 11.5–15)
PLATELET # BLD: 211 E9/L (ref 130–450)
PMV BLD AUTO: 10.6 FL (ref 7–12)
POTASSIUM SERPL-SCNC: 4.3 MMOL/L (ref 3.5–5)
RBC # BLD: 5.76 E12/L (ref 3.8–5.8)
SODIUM BLD-SCNC: 140 MMOL/L (ref 132–146)
TOTAL PROTEIN: 7.4 G/DL (ref 6.4–8.3)
TRIGL SERPL-MCNC: 60 MG/DL (ref 0–149)
VLDLC SERPL CALC-MCNC: 12 MG/DL
WBC # BLD: 4.3 E9/L (ref 4.5–11.5)

## 2022-04-20 PROCEDURE — 99395 PREV VISIT EST AGE 18-39: CPT | Performed by: NURSE PRACTITIONER

## 2022-04-20 SDOH — ECONOMIC STABILITY: FOOD INSECURITY: WITHIN THE PAST 12 MONTHS, YOU WORRIED THAT YOUR FOOD WOULD RUN OUT BEFORE YOU GOT MONEY TO BUY MORE.: NEVER TRUE

## 2022-04-20 SDOH — ECONOMIC STABILITY: FOOD INSECURITY: WITHIN THE PAST 12 MONTHS, THE FOOD YOU BOUGHT JUST DIDN'T LAST AND YOU DIDN'T HAVE MONEY TO GET MORE.: NEVER TRUE

## 2022-04-20 ASSESSMENT — ENCOUNTER SYMPTOMS
COUGH: 0
DIARRHEA: 0
VOICE CHANGE: 0
BACK PAIN: 0
FACIAL SWELLING: 0
TROUBLE SWALLOWING: 0
RHINORRHEA: 0
VOMITING: 0
ABDOMINAL PAIN: 0
NAUSEA: 0
CHEST TIGHTNESS: 0
WHEEZING: 0
SINUS PAIN: 0
SHORTNESS OF BREATH: 0
COLOR CHANGE: 0
CONSTIPATION: 0
SINUS PRESSURE: 0
SORE THROAT: 0

## 2022-04-20 ASSESSMENT — SOCIAL DETERMINANTS OF HEALTH (SDOH): HOW HARD IS IT FOR YOU TO PAY FOR THE VERY BASICS LIKE FOOD, HOUSING, MEDICAL CARE, AND HEATING?: NOT HARD AT ALL

## 2022-04-20 ASSESSMENT — PATIENT HEALTH QUESTIONNAIRE - PHQ9
SUM OF ALL RESPONSES TO PHQ QUESTIONS 1-9: 0
SUM OF ALL RESPONSES TO PHQ QUESTIONS 1-9: 0
2. FEELING DOWN, DEPRESSED OR HOPELESS: 0
SUM OF ALL RESPONSES TO PHQ QUESTIONS 1-9: 0
SUM OF ALL RESPONSES TO PHQ QUESTIONS 1-9: 0
SUM OF ALL RESPONSES TO PHQ9 QUESTIONS 1 & 2: 0
1. LITTLE INTEREST OR PLEASURE IN DOING THINGS: 0

## 2022-04-20 NOTE — PATIENT INSTRUCTIONS
The medication list included in this document is our record of what you are currently taking, including any changes that were made at today's visit.  If you find any differences when compared to your medications at home, or have any questions that were not answered at your visit, please contact the office. Patient Education        Well Visit, Ages 25 to 48: Care Instructions  Overview     Well visits can help you stay healthy. Your doctor has checked your overall health and may have suggested ways to take good care of yourself. Your doctor also may have recommended tests. At home, you can help prevent illness withhealthy eating, regular exercise, and other steps. Follow-up care is a key part of your treatment and safety. Be sure to make and go to all appointments, and call your doctor if you are having problems. It's also a good idea to know your test results and keep alist of the medicines you take. How can you care for yourself at home?  Get screening tests that you and your doctor decide on. Screening helps find diseases before any symptoms appear.  Eat healthy foods. Choose fruits, vegetables, whole grains, protein, and low-fat dairy foods. Limit fat, especially saturated fat. Reduce salt in your diet.  Limit alcohol. If you are a man, have no more than 2 drinks a day or 14 drinks a week. If you are a woman, have no more than 1 drink a day or 7 drinks a week.  Get at least 30 minutes of physical activity on most days of the week. Walking is a good choice. You also may want to do other activities, such as running, swimming, cycling, or playing tennis or team sports. Discuss any changes in your exercise program with your doctor.  Reach and stay at a healthy weight. This will lower your risk for many problems, such as obesity, diabetes, heart disease, and high blood pressure.  Do not smoke or allow others to smoke around you.  If you need help quitting, talk to your doctor about stop-smoking programs and medicines. These can increase your chances of quitting for good.  Care for your mental health. It is easy to get weighed down by worry and stress. Learn strategies to manage stress, like deep breathing and mindfulness, and stay connected with your family and community. If you find you often feel sad or hopeless, talk with your doctor. Treatment can help.  Talk to your doctor about whether you have any risk factors for sexually transmitted infections (STIs). You can help prevent STIs if you wait to have sex with a new partner (or partners) until you've each been tested for STIs. It also helps if you use condoms (male or female condoms) and if you limit your sex partners to one person who only has sex with you. Vaccines are available for some STIs, such as HPV.  Use birth control if it's important to you to prevent pregnancy. Talk with your doctor about the choices available and what might be best for you.  If you think you may have a problem with alcohol or drug use, talk to your doctor. This includes prescription medicines (such as amphetamines and opioids) and illegal drugs (such as cocaine and methamphetamine). Your doctor can help you figure out what type of treatment is best for you.  Protect your skin from too much sun. When you're outdoors from 10 a.m. to 4 p.m., stay in the shade or cover up with clothing and a hat with a wide brim. Wear sunglasses that block UV rays. Even when it's cloudy, put broad-spectrum sunscreen (SPF 30 or higher) on any exposed skin.  See a dentist one or two times a year for checkups and to have your teeth cleaned.  Wear a seat belt in the car. When should you call for help? Watch closely for changes in your health, and be sure to contact your doctor if you have any problems or symptoms that concern you. Where can you learn more? Go to https://[facility]. org and sign in to your [employer] account.  Enter P072 in the [address] Information box to learn more about \"Well Visit, Ages 25 to 48: Care Instructions. \"     If you do not have an account, please click on the \"Sign Up Now\" link. Current as of: October 6, 2021               Content Version: 13.2  © 4674-4426 [employer]. Care instructions adapted under license by [facility] ([facility]). If you have questions about a medical condition or this instruction, always ask your healthcare professional. [address] any warranty or liability for your use of this information.

## 2022-04-20 NOTE — PROGRESS NOTES
ANNUAL PHYSICAL      Chief Complaint:   Christine Casey is a 28 y.o. male who presents for complete physical examination    HPI: Patient is here for routine yearly physical/preventative visit. Patient has no complaints or concerns today. Patient is  generally healthy. Chronic problems are generally controlled. No recent labs to review with patient  Health maintenance reviewed with patient and is not up to date. Overall doing well. He would like screened for HIV, hepatitis B due to a relationship he had.       LAST DENTAL EXAM-April 2022  LAST EYE EXAM-2019   Health Maintenance   Topic Date Due    COVID-19 Vaccine (1) Never done    Depression Screen  Never done    HIV screen  Never done    Hepatitis C screen  Never done    DTaP/Tdap/Td vaccine (2 - Td or Tdap) 02/06/2030    Flu vaccine  Completed    Hepatitis A vaccine  Aged Out    Hepatitis B vaccine  Aged Out    Hib vaccine  Aged Out    Meningococcal (ACWY) vaccine  Aged Out    Pneumococcal 0-64 years Vaccine  Aged Out    Varicella vaccine  Discontinued        Patient Active Problem List   Diagnosis    Chronic nasal congestion    Globus sensation    FH: heart disease    High risk heterosexual behavior    Annual physical exam     Past Medical History:   Diagnosis Date    Allergic rhinitis     Asthma, exercise induced 2000    COVID 01/2022    Osteoarthritis     Sacrococcygeal pilonidal cyst 8/19/2019       Past Surgical History:   Procedure Laterality Date    FRACTURE SURGERY Right     hand    PILONIDAL CYST EXCISION N/A 10/4/2019    PILONIDAL CYSTECTOMY performed by Jeramy Noramn MD at [address] [place] [address]         Current Outpatient Medications   Medication Sig Dispense Refill    acetaminophen (TYLENOL) 500 MG tablet Take 1,000 mg by mouth every 6 hours as needed for Pain       No current facility-administered medications for this visit. Allergies   Allergen Reactions    Penicillins      Dad and grandfather is allergic    Prelone [Prednisolone Anhydrous]      hallucinations    Cleocin [Clindamycin Hcl] Rash     Immunization History   Administered Date(s) Administered    Influenza A ([id number]) Vaccine PF IM 12/07/2009    Influenza Virus Vaccine 10/18/2017, 10/01/2018, 10/01/2019, 10/01/2019, 10/10/2020, 10/13/2021    Tdap (Boostrix, Adacel) 02/06/2020      Social History     Socioeconomic History    Marital status:      Spouse name: None    Number of children: None    Years of education: None    Highest education level: None   Occupational History    None   Tobacco Use    Smoking status: Never Smoker    Smokeless tobacco: Never Used   Vaping Use    Vaping Use: Never used   Substance and Sexual Activity    Alcohol use: Yes     Alcohol/week: 1.0 standard drink     Types: 1 Cans of beer per week     Comment: every couple of weeks    Drug use: No    Sexual activity: Yes     Partners: Female   Other Topics Concern    None   Social History Narrative    None     Social Determinants of Health     Financial Resource Strain: Low Risk     Difficulty of Paying Living Expenses: Not hard at all   Food Insecurity: No Food Insecurity    Worried About Running Out of Food in the Last Year: Never true    Tita of Food in the Last Year: Never true   Transportation Needs:     Lack of Transportation (Medical): Not on file    Lack of Transportation (Non-Medical):  Not on file   Physical Activity:     Days of Exercise per Week: Not on file    Minutes of Exercise per Session: Not on file   Stress:     Feeling of Stress : Not on file   Social Connections:     Frequency of Communication with Friends and Family: Not on file    Frequency of Social Gatherings with Friends and Family: Not on file    Attends [employer] Services: Not on file    Active Member of Clubs or Organizations: Not on file    Attends [employer] or Organization Meetings: Not on file    Marital Status: Not on file   Intimate Partner Violence:     Fear of Current or Ex-Partner: Not on file    Emotionally Abused: Not on file    Physically Abused: Not on file    Sexually Abused: Not on file   Housing Stability:     Unable to Pay for Housing in the Last Year: Not on file    Number of [place] in the Last Year: Not on file    Unstable Housing in the Last Year: Not on file     Family History   Problem Relation Age of Onset    High Cholesterol Mother     High Blood Pressure Mother     Hypothyroidism Mother     Hypotension Father     Cancer Father         squamous cell skin cancer    High Blood Pressure Brother     Obesity Brother     Stroke Maternal Grandmother     Diabetes Maternal Grandmother     Other Maternal Grandmother         blood clots    Diabetes Maternal Grandfather     Heart Disease Maternal Grandfather     Cancer Maternal Grandfather         skin    Kidney Disease Maternal Grandfather     Heart Disease Paternal Grandmother     Prostate Cancer Paternal Grandfather            Review Of Systems  Review of Systems   Constitutional: Negative for activity change, appetite change, chills, diaphoresis, fatigue, fever and unexpected weight change. HENT: Positive for postnasal drip. Negative for congestion, dental problem, drooling, ear discharge, ear pain, facial swelling, hearing loss, mouth sores, nosebleeds, rhinorrhea, sinus pressure, sinus pain, sneezing, sore throat, tinnitus, trouble swallowing and voice change. Eyes: Negative for visual disturbance. Respiratory: Negative for cough, chest tightness, shortness of breath and wheezing. Cardiovascular: Negative for chest pain, palpitations and leg swelling. Gastrointestinal: Negative for abdominal pain, constipation, diarrhea, nausea and vomiting. Endocrine: Negative for cold intolerance, heat intolerance, polydipsia, polyphagia and polyuria.    Genitourinary: Negative for difficulty urinating, frequency and urgency. Musculoskeletal: Negative for arthralgias, back pain, gait problem, joint swelling, myalgias, neck pain and neck stiffness. Skin: Negative for color change, pallor, rash and wound. Allergic/Immunologic: Negative for environmental allergies, food allergies and immunocompromised state. Neurological: Negative for dizziness, tremors, seizures, syncope, facial asymmetry, speech difficulty, weakness, light-headedness, numbness and headaches. Hematological: Negative for adenopathy. Does not bruise/bleed easily. Psychiatric/Behavioral: Negative for agitation, behavioral problems, confusion, decreased concentration, dysphoric mood, hallucinations, self-injury, sleep disturbance and suicidal ideas. The patient is not nervous/anxious and is not hyperactive. PHYSICAL EXAMINATION:  /82   Pulse 86   Temp 98 °F (36.7 °C)   Resp 16   Ht 5' 10\" (1.778 m)   Wt 154 lb (69.9 kg)   SpO2 100%   BMI 22.10 kg/m²   General appearance: healthy, alert, no distress  Skin: Skin color, texture, turgor normal. No rashes or lesions. No induration or tightening palpated. Ganglion cyst on right forearm non tender. Head: Normocephalic. No masses, lesions, tenderness or abnormalities  Eyes: conjunctivae/corneas clear. PERRL, EOM's intact. Fundi are normal, no papilledema, hemorrhages or exudates. No AV crossing changes are noted. Ears: External ears normal. Canals clear. TM's clear bilaterally. Hearing normal to finger rub. Nose/Sinuses: Nares normal. Septum midline. Mucosa normal. No drainage or sinus tenderness. Oropharynx: Lips, mucosa, and tongue normal. Teeth and gums normal. Oropharynx clear with no exudate seen. Neck: Neck supple, and symmetric. No adenopathy. Thyroid symmetric, normal size, without nodule. Trachea is midline. Back: Back symmetric, no curvature. ROM normal. No CVA tenderness. Lungs: Good diaphragmatic excursion.  Lungs clear to auscultation bilaterally. No retractions or use of accessory muscles. No tactile fremitus. Normal chest percussion. Heart: PMI is not displaced, and no thrill noted. Regular rate and rhythm, with no rub, murmur or gallop noted. Abdomen: Abdomen soft, non-tender. BS normal. No masses, organomegaly. No hernia noted. Inguinal Area: Penis and Scrotum are normal.Inguinal rings are intact. Extremities: Extremities normal. No deformities, edema, or skin discoloration. No cyanosis or clubbing noted to the nails. Lymph: No lymphadenopathy of the neck or supraclavicular regions. Musculoskeletal: Spine ROM normal. Muscular strength intact. Peripheral pulses: radial=4/4, femoral=4/4, dorsalis pedis=4/4,  Neuro: Cranial nerves intact, Gait normal. Reflexes normal and symmetric, with no pathologic reflex noted. No focal weakness. Normal sensation to light touch. ASSESSMENT:    1. Annual physical exam  -     CBC with Auto Differential; Future  -     Comprehensive Metabolic Panel; Future  -     Lipid Panel; Future  2. High risk heterosexual behavior  -     HIV Screen; Future  -     Hepatitis B Surface Antibody; Future       Plan:    Patient Instructions   The medication list included in this document is our record of what you are currently taking, including any changes that were made at today's visit.  If you find any differences when compared to your medications at home, or have any questions that were not answered at your visit, please contact the office. Patient Education        Well Visit, Ages 25 to 48: Care Instructions  Overview     Well visits can help you stay healthy. Your doctor has checked your overall health and may have suggested ways to take good care of yourself. Your doctor also may have recommended tests. At home, you can help prevent illness withhealthy eating, regular exercise, and other steps. Follow-up care is a key part of your treatment and safety.  Be sure to make and go to all appointments, and call your doctor if you are having problems. It's also a good idea to know your test results and keep alist of the medicines you take. How can you care for yourself at home?  Get screening tests that you and your doctor decide on. Screening helps find diseases before any symptoms appear.  Eat healthy foods. Choose fruits, vegetables, whole grains, protein, and low-fat dairy foods. Limit fat, especially saturated fat. Reduce salt in your diet.  Limit alcohol. If you are a man, have no more than 2 drinks a day or 14 drinks a week. If you are a woman, have no more than 1 drink a day or 7 drinks a week.  Get at least 30 minutes of physical activity on most days of the week. Walking is a good choice. You also may want to do other activities, such as running, swimming, cycling, or playing tennis or team sports. Discuss any changes in your exercise program with your doctor.  Reach and stay at a healthy weight. This will lower your risk for many problems, such as obesity, diabetes, heart disease, and high blood pressure.  Do not smoke or allow others to smoke around you. If you need help quitting, talk to your doctor about stop-smoking programs and medicines. These can increase your chances of quitting for good.  Care for your mental health. It is easy to get weighed down by worry and stress. Learn strategies to manage stress, like deep breathing and mindfulness, and stay connected with your family and community. If you find you often feel sad or hopeless, talk with your doctor. Treatment can help.  Talk to your doctor about whether you have any risk factors for sexually transmitted infections (STIs). You can help prevent STIs if you wait to have sex with a new partner (or partners) until you've each been tested for STIs. It also helps if you use condoms (male or female condoms) and if you limit your sex partners to one person who only has sex with you.  Vaccines are available for some STIs, such as HPV.  Use birth control if it's important to you to prevent pregnancy. Talk with your doctor about the choices available and what might be best for you.  If you think you may have a problem with alcohol or drug use, talk to your doctor. This includes prescription medicines (such as amphetamines and opioids) and illegal drugs (such as cocaine and methamphetamine). Your doctor can help you figure out what type of treatment is best for you.  Protect your skin from too much sun. When you're outdoors from 10 a.m. to 4 p.m., stay in the shade or cover up with clothing and a hat with a wide brim. Wear sunglasses that block UV rays. Even when it's cloudy, put broad-spectrum sunscreen (SPF 30 or higher) on any exposed skin.  See a dentist one or two times a year for checkups and to have your teeth cleaned.  Wear a seat belt in the car. When should you call for help? Watch closely for changes in your health, and be sure to contact your doctor if you have any problems or symptoms that concern you. Where can you learn more? Go to https://[employer]. org and sign in to your [employer] account. Enter P072 in the [employer] box to learn more about \"Well Visit, Ages 25 to 48: Care Instructions. \"     If you do not have an account, please click on the \"Sign Up Now\" link. Current as of: October 6, 2021               Content Version: 13.2  © 2006-2022 [employer]wise[employer] Inc[employer]. Care instructions adapted under license by [facility] ([facility]). If you have questions about a medical condition or this instruction, always ask your healthcare professional. [place] [address] any warranty or liability for your use of this information. Discussed reducing caffeine intake and Discussed taking medications as directed and adverse effects    · Reach and stay at a healthy weight.  This will lower your risk for many problems, such as obesity, diabetes, heart disease, and high blood pressure. · Get at least 30 minutes of exercise on most days of the week. Walking is a good choice. You also may want to do other activities, such as running, swimming, cycling, or playing tennis or team sports. · Do not smoke. Smoking can make health problems worse. If you need help quitting, talk to your doctor about stop-smoking programs and medicines. These can increase your chances of quitting for good. · Protect your skin from too much sun. When you're outdoors from 10 a.m. to 4 p.m., stay in the shade or cover up with clothing and a hat with a wide brim. Wear sunglasses that block UV rays. Even when it's cloudy, put broad-spectrum sunscreen (SPF 30 or higher) on any exposed skin. · See a dentist one or two times a year for checkups and to have your teeth cleaned. · Wear a seat belt in the car. · Limit alcohol to 1 drink a day. Too much alcohol can cause health problems. Return in about 1 year (around 4/20/2023) for Annual exam.         I have reviewed my findings and recommendations with Dee Benavidez.     José Miguel Reaves, APRN - CNP,APRN, CNP

## 2022-04-21 LAB
HBV SURFACE AB TITR SER: REACTIVE {TITER}
HIV-1 AND HIV-2 ANTIBODIES: NORMAL
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
## 2022-05-09 RX ORDER — DOXYCYCLINE HYCLATE 100 MG
100 TABLET ORAL 2 TIMES DAILY
Qty: 28 TABLET | Refills: 0 | Status: SHIPPED | OUTPATIENT
Start: 2022-05-09 | End: 2022-05-23

## 2022-05-20 PROBLEM — Z00.00 ANNUAL PHYSICAL EXAM: Status: RESOLVED | Noted: 2022-04-20 | Resolved: 2022-05-20

## 2022-11-04 ENCOUNTER — NURSE TRIAGE (OUTPATIENT)
Dept: OTHER | Facility: CLINIC | Age: 32
End: 2022-11-04

## 2022-11-04 NOTE — TELEPHONE ENCOUNTER
Location of patient: [place]    Subjective: Caller states \"I spoke with you guys yesterday, I hit my head on a rock while on my honeymoon here and am having pretty bad nose bleeds. I did go to the ED yesterday, but the symptoms haven't gotten any better. \"     Did not triage d/t patient location out of license area. Writer explained lower copay to patient and recommended following AVS instructions and to go back to ED/[facility] if worsening symptoms until returns to states. Caller stated understanding. Attention Provider: Thank you for allowing me to participate in the care of your patient. The patient was connected to triage in response to symptoms provided. Please do not respond through this encounter as the response is not directed to a shared pool.         Reason for Disposition  Lester Kaur Caller has already spoken with another triager and has no further questions    Protocols used: No Contact or Duplicate Contact Call-ADULT-[place]

## 2023-01-15 ENCOUNTER — HOSPITAL ENCOUNTER (EMERGENCY)
Age: 33
Discharge: HOME | End: 2023-01-15
Attending: EMERGENCY MEDICINE
Payer: COMMERCIAL

## 2023-01-15 ENCOUNTER — NURSE TRIAGE (OUTPATIENT)
Dept: OTHER | Facility: CLINIC | Age: 33
End: 2023-01-15

## 2023-01-15 ENCOUNTER — APPOINTMENT (OUTPATIENT)
Dept: GENERAL RADIOLOGY | Age: 33
End: 2023-01-15
Payer: COMMERCIAL

## 2023-01-15 VITALS
HEART RATE: 71 BPM | RESPIRATION RATE: 18 BRPM | TEMPERATURE: 98.7 F | SYSTOLIC BLOOD PRESSURE: 136 MMHG | OXYGEN SATURATION: 98 % | DIASTOLIC BLOOD PRESSURE: 78 MMHG

## 2023-01-15 DIAGNOSIS — R00.2 PALPITATIONS: Primary | ICD-10-CM

## 2023-01-15 LAB
ANION GAP SERPL CALCULATED.3IONS-SCNC: 12 MMOL/L (ref 7–16)
BASOPHILS ABSOLUTE: 0.04 E9/L (ref 0–0.2)
BASOPHILS RELATIVE PERCENT: 0.6 % (ref 0–2)
BUN BLDV-MCNC: 14 MG/DL (ref 6–20)
CALCIUM SERPL-MCNC: 9.8 MG/DL (ref 8.6–10.2)
CHLORIDE BLD-SCNC: 100 MMOL/L (ref 98–107)
CO2: 26 MMOL/L (ref 22–29)
CREAT SERPL-MCNC: 0.8 MG/DL (ref 0.7–1.2)
EKG ATRIAL RATE: 73 BPM
EKG P AXIS: 68 DEGREES
EKG P-R INTERVAL: 120 MS
EKG Q-T INTERVAL: 372 MS
EKG QRS DURATION: 106 MS
EKG QTC CALCULATION (BAZETT): 409 MS
EKG R AXIS: 76 DEGREES
EKG T AXIS: 24 DEGREES
EKG VENTRICULAR RATE: 73 BPM
EOSINOPHILS ABSOLUTE: 0.04 E9/L (ref 0.05–0.5)
EOSINOPHILS RELATIVE PERCENT: 0.6 % (ref 0–6)
GFR SERPL CREATININE-BSD FRML MDRD: >60 ML/MIN/1.73
GLUCOSE BLD-MCNC: 100 MG/DL (ref 74–99)
HCT VFR BLD CALC: 47.8 % (ref 37–54)
HEMOGLOBIN: 15.8 G/DL (ref 12.5–16.5)
IMMATURE GRANULOCYTES #: 0.01 E9/L
IMMATURE GRANULOCYTES %: 0.2 % (ref 0–5)
LYMPHOCYTES ABSOLUTE: 2.23 E9/L (ref 1.5–4)
LYMPHOCYTES RELATIVE PERCENT: 34 % (ref 20–42)
MAGNESIUM: 2.1 MG/DL (ref 1.6–2.6)
MCH RBC QN AUTO: 27.4 PG (ref 26–35)
MCHC RBC AUTO-ENTMCNC: 33.1 % (ref 32–34.5)
MCV RBC AUTO: 82.8 FL (ref 80–99.9)
MONOCYTES ABSOLUTE: 0.49 E9/L (ref 0.1–0.95)
MONOCYTES RELATIVE PERCENT: 7.5 % (ref 2–12)
NEUTROPHILS ABSOLUTE: 3.75 E9/L (ref 1.8–7.3)
NEUTROPHILS RELATIVE PERCENT: 57.1 % (ref 43–80)
PDW BLD-RTO: 11.9 FL (ref 11.5–15)
PLATELET # BLD: 244 E9/L (ref 130–450)
PMV BLD AUTO: 10.8 FL (ref 7–12)
POTASSIUM SERPL-SCNC: 3.5 MMOL/L (ref 3.5–5)
RBC # BLD: 5.77 E12/L (ref 3.8–5.8)
SARS-COV-2, NAAT: NOT DETECTED
SODIUM BLD-SCNC: 138 MMOL/L (ref 132–146)
TSH SERPL DL<=0.05 MIU/L-ACNC: 2.53 UIU/ML (ref 0.27–4.2)
WBC # BLD: 6.6 E9/L (ref 4.5–11.5)

## 2023-01-15 PROCEDURE — 99285 EMERGENCY DEPT VISIT HI MDM: CPT

## 2023-01-15 PROCEDURE — 84443 ASSAY THYROID STIM HORMONE: CPT

## 2023-01-15 PROCEDURE — 71046 X-RAY EXAM CHEST 2 VIEWS: CPT

## 2023-01-15 PROCEDURE — 83735 ASSAY OF MAGNESIUM: CPT

## 2023-01-15 PROCEDURE — 85025 COMPLETE CBC W/AUTO DIFF WBC: CPT

## 2023-01-15 PROCEDURE — 80048 BASIC METABOLIC PNL TOTAL CA: CPT

## 2023-01-15 PROCEDURE — 6370000000 HC RX 637 (ALT 250 FOR IP): Performed by: EMERGENCY MEDICINE

## 2023-01-15 PROCEDURE — 87635 SARS-COV-2 COVID-19 AMP PRB: CPT

## 2023-01-15 PROCEDURE — 93010 ELECTROCARDIOGRAM REPORT: CPT | Performed by: INTERNAL MEDICINE

## 2023-01-15 PROCEDURE — 93005 ELECTROCARDIOGRAM TRACING: CPT | Performed by: EMERGENCY MEDICINE

## 2023-01-15 RX ORDER — HYDROXYZINE PAMOATE 25 MG/1
25 CAPSULE ORAL 3 TIMES DAILY PRN
Qty: 42 CAPSULE | Refills: 0 | Status: SHIPPED | OUTPATIENT
Start: 2023-01-15 | End: 2023-01-29

## 2023-01-15 RX ORDER — HYDROXYZINE PAMOATE 25 MG/1
50 CAPSULE ORAL ONCE
Status: COMPLETED | OUTPATIENT
Start: 2023-01-15 | End: 2023-01-15

## 2023-01-15 RX ORDER — FAMOTIDINE 20 MG/1
20 TABLET, FILM COATED ORAL NIGHTLY PRN
COMMUNITY

## 2023-01-15 RX ADMIN — HYDROXYZINE PAMOATE 50 MG: 25 CAPSULE ORAL at 14:33

## 2023-01-15 ASSESSMENT — ENCOUNTER SYMPTOMS
BLOOD IN STOOL: 0
NAUSEA: 0
SHORTNESS OF BREATH: 0
VOMITING: 0
RHINORRHEA: 0
COUGH: 0
ABDOMINAL PAIN: 0
TROUBLE SWALLOWING: 0
COLOR CHANGE: 0
DIARRHEA: 0

## 2023-01-15 ASSESSMENT — PAIN DESCRIPTION - LOCATION: LOCATION: CHEST

## 2023-01-15 ASSESSMENT — PAIN - FUNCTIONAL ASSESSMENT: PAIN_FUNCTIONAL_ASSESSMENT: 0-10

## 2023-01-15 ASSESSMENT — PAIN DESCRIPTION - ORIENTATION: ORIENTATION: LEFT

## 2023-01-15 NOTE — TELEPHONE ENCOUNTER
Location of patient: [place]    Subjective: Caller states \"Went to a chiropractor for back pain and since then I feel like I am having palpitations\"     Current Symptoms: Back pain, left side mid back  Heart palpitations, feels like it is beating harder than normal, pulse WNL  /88 at chiropractic office recently  Slight chest pain at the point the sternum meets the ribs on the left side  Denies difficulty breathing, excessive sweating, dizziness, arm pain and jaw pain. Onset: 2 weeks ago; Sudden    Pain Severity: 0/10 for the reason for call. He mentions back pain but he is mor concerned about heart \"palpitations\"    Temperature: NA     What has been tried: Nothing    LMP: NA Pregnant: NA    Recommended disposition: Go to ED Now    Care advice provided, patient verbalizes understanding; denies any other questions or concerns; instructed to call back for any new or worsening symptoms. Advised to call PCP office in the morning and discuss the heart palpitations, he informs me at that time that he has been having shoulder pain for the past hour. For that reason    Attention Provider: Thank you for allowing me to participate in the care of your patient. The patient was connected to triage in response to symptoms provided. Please do not respond through this encounter as the response is not directed to a shared pool.   Reason for Disposition   [1] Palpitations AND [2] no improvement after using CARE ADVICE   [1] Pain lasting > 5 minutes AND [2] pain also present in chest  (Exception: pain is clearly made worse by movement)    Protocols used: Heart Rate and Heartbeat Questions-ADULT-, Shoulder Pain-ADULT-

## 2023-01-15 NOTE — ED PROVIDER NOTES
ED PROVIDER NOTE    Chief Complaint   Patient presents with    Palpitations     x6 days, L shoulder pain       HPI:  1/15/23,   Time: 2:20 PM ELIN Rossi is a 33 y.o. male presenting to the ED for palpitations. Ongoing for the past 5 days, intermittent, feels like heart is pounding. Associated left sided neck and chest pain and tightness. No associated shortness of breath, nausea, vomiting, diaphoresis. Some associated anxiety. Last week was having left sided neck pain radiating to the left side of the chest, saw a chiropractor and felt some worsening of pain afterwards. No active chest pain currently. No fever, chills, cough, abdominal pain, diarrhea. Normal po intake and urine output. No drug use. Social etoh. Heavy caffeine use. Chart review: hx of asthma, OA    Reviewed office visit with PCP Ruth Soto on 4/20/22:  Routine labs ordered and obtained    Review of Systems:     Review of Systems   Constitutional:  Negative for appetite change, chills and fever. HENT:  Negative for congestion, rhinorrhea and trouble swallowing. Eyes:  Negative for visual disturbance. Respiratory:  Negative for cough and shortness of breath. Cardiovascular:  Positive for chest pain and palpitations. Negative for leg swelling. Gastrointestinal:  Negative for abdominal pain, blood in stool, diarrhea, nausea and vomiting. Genitourinary:  Negative for decreased urine volume, difficulty urinating, dysuria, frequency, hematuria and urgency. Musculoskeletal:  Negative for myalgias, neck pain and neck stiffness. Skin:  Negative for color change.    Neurological:  Negative for dizziness, syncope, weakness, light-headedness, numbness and headaches.       --------------------------------------------- PAST HISTORY ---------------------------------------------  Past Medical History:   Past Medical History:   Diagnosis Date    Allergic rhinitis     Asthma, exercise induced 2000    COVID 01/2022 Osteoarthritis     Sacrococcygeal pilonidal cyst 8/19/2019       Past Surgical History:   Past Surgical History:   Procedure Laterality Date    FRACTURE SURGERY Right     hand    PILONIDAL CYST EXCISION N/A 10/4/2019    PILONIDAL CYSTECTOMY performed by Mike Rankin MD at [facility]         Social History:   Social History     Socioeconomic History    Marital status:    Tobacco Use    Smoking status: Never    Smokeless tobacco: Never   Vaping Use    Vaping Use: Never used   Substance and Sexual Activity    Alcohol use: Yes     Alcohol/week: 1.0 standard drink     Types: 1 Cans of beer per week     Comment: every couple of weeks    Drug use: No    Sexual activity: Yes     Partners: Female     Social Determinants of Health     Financial Resource Strain: Low Risk     Difficulty of Paying Living Expenses: Not hard at all   Food Insecurity: No Food Insecurity    Worried About Running Out of Food in the Last Year: Never true    Ran Out of Food in the Last Year: Never true       Family History:   Family History   Problem Relation Age of Onset    High Cholesterol Mother     High Blood Pressure Mother     Hypothyroidism Mother     Hypotension Father     Cancer Father         squamous cell skin cancer    High Blood Pressure Brother     Obesity Brother     Stroke Maternal Grandmother     Diabetes Maternal Grandmother     Other Maternal Grandmother         blood clots    Diabetes Maternal Grandfather     Heart Disease Maternal Grandfather     Cancer Maternal Grandfather         skin    Kidney Disease Maternal Grandfather     Heart Disease Paternal Grandmother     Prostate Cancer Paternal Grandfather        The patients home medications have been reviewed. Allergies:    Allergies   Allergen Reactions    Penicillins      Dad and grandfather is allergic    Prelone [Prednisolone Anhydrous]      hallucinations    Cleocin [Clindamycin Hcl] Rash           ---------------------------------------------------PHYSICAL EXAM--------------------------------------    Pulse 86   Temp 98.7 °F (37.1 °C)   Resp 16   SpO2 98%     Physical Exam  Vitals and nursing note reviewed. Constitutional:       General: He is not in acute distress. Appearance: He is not toxic-appearing. HENT:      Mouth/Throat:      Mouth: Mucous membranes are moist.   Eyes:      General: No scleral icterus. Extraocular Movements: Extraocular movements intact. Pupils: Pupils are equal, round, and reactive to light. Cardiovascular:      Rate and Rhythm: Normal rate and regular rhythm. Pulses: Normal pulses. Heart sounds: Normal heart sounds. No murmur heard. Pulmonary:      Effort: Pulmonary effort is normal. No respiratory distress. Breath sounds: Normal breath sounds. No wheezing or rales. Abdominal:      General: There is no distension. Palpations: Abdomen is soft. Tenderness: There is no abdominal tenderness. There is no guarding or rebound. Musculoskeletal:         General: No swelling or tenderness. Normal range of motion. Cervical back: Normal range of motion and neck supple. No rigidity. No muscular tenderness. Comments: Radial, DP, and PT pulses 2+ bilaterally. Skin:     General: Skin is warm and dry. Neurological:      Mental Status: He is alert and oriented to person, place, and time. Comments: Strength 5/5 and sensation grossly intact to light touch and equal bilaterally throughout all extremities          -------------------------------------------------- RESULTS -------------------------------------------------  I have personally reviewed all laboratory and imaging results for this patient. Results are listed below.      LABS:  Labs Reviewed   BASIC METABOLIC PANEL - Abnormal; Notable for the following components:       Result Value    Glucose 100 (*)     All other components within normal limits   CBC WITH AUTO DIFFERENTIAL - Abnormal; Notable for the following components:    Eosinophils Absolute 0.04 (*)     All other components within normal limits   COVID-19, RAPID   MAGNESIUM   TSH       RADIOLOGY:  Interpreted personally and by Radiologist.  XR CHEST (2 VW)   Final Result   No acute cardiopulmonary abnormality. EKG:  This EKG is signed and interpreted by the EP. Normal sinus rhythm, vent rate 73bpm, normal axis and intervals, no acute injury pattern, no prior EKG on file for comparison       ------------------------- NURSING NOTES AND VITALS REVIEWED ---------------------------   The nursing notes within the ED encounter and vital signs as below have been reviewed by myself. Pulse 86   Temp 98.7 °F (37.1 °C)   Resp 16   SpO2 98%   Oxygen Saturation Interpretation: Normal    The patients available past medical records and past encounters were reviewed. ------------------------------ ED COURSE/MEDICAL DECISION MAKING----------------------  Medications   hydrOXYzine pamoate (VISTARIL) capsule 50 mg (50 mg Oral Given 1/15/23 1433)     Independent interpretation of tests:  No clinically significant lab abnormalities  CXR - no focal consolidation, pneumothorax, or pleural effusion     Counseling: The emergency provider has spoken with the patient and discussed todays results, in addition to providing specific details for the plan of care and counseling regarding the diagnosis and prognosis. Questions are answered at this time and they are agreeable with the plan. ED Course/Medical Decision Makin y.o. male here with palpitations. Non-toxic appearing, afebrile, hemodynamically stable, and in no acute distress. Breathing comfortably on room air without respiratory distress. Neurovascularly intact throughout. Does experience anxiety intermittently but none specifically associated with episodes of palpitations. Labs and EKG reassuring.  No active chest pain during ED evaluation to suggest ACS. Treated w/ vistaril and sx improving on reevlauation. After discussion of findings and return precautions, patient agrees with plan for discharge and outpatient follow up with PCP.        --------------------------------- IMPRESSION AND DISPOSITION ---------------------------------    IMPRESSION  1. Palpitations        DISPOSITION  Disposition: Discharge to home  Patient condition is good    NOTE: This report was transcribed using voice recognition software.  Every effort was made to ensure accuracy; however, inadvertent computerized transcription errors may be present    Shon Hamilton MD  Attending Emergency Physician         Shon Hamilton MD  01/15/23 0242

## 2023-01-16 NOTE — PROGRESS NOTES
CLINICAL PHARMACY NOTE: MEDS TO BEDS    Total # of Prescriptions Filled: 1   The following medications were delivered to the patient:  Hydroxyzine pamoate 25 mg    Additional Documentation:

## 2023-03-17 ENCOUNTER — OFFICE VISIT (OUTPATIENT)
Dept: FAMILY MEDICINE CLINIC | Age: 33
End: 2023-03-17
Payer: COMMERCIAL

## 2023-03-17 VITALS
OXYGEN SATURATION: 99 % | BODY MASS INDEX: 22.05 KG/M2 | HEART RATE: 92 BPM | RESPIRATION RATE: 17 BRPM | HEIGHT: 70 IN | DIASTOLIC BLOOD PRESSURE: 79 MMHG | TEMPERATURE: 97.4 F | WEIGHT: 154 LBS | SYSTOLIC BLOOD PRESSURE: 152 MMHG

## 2023-03-17 DIAGNOSIS — J02.9 SORE THROAT: Primary | ICD-10-CM

## 2023-03-17 PROCEDURE — 99213 OFFICE O/P EST LOW 20 MIN: CPT

## 2023-03-17 SDOH — ECONOMIC STABILITY: HOUSING INSECURITY
IN THE LAST 12 MONTHS, WAS THERE A TIME WHEN YOU DID NOT HAVE A STEADY PLACE TO SLEEP OR SLEPT IN A SHELTER (INCLUDING NOW)?: NO

## 2023-03-17 SDOH — ECONOMIC STABILITY: FOOD INSECURITY: WITHIN THE PAST 12 MONTHS, THE FOOD YOU BOUGHT JUST DIDN'T LAST AND YOU DIDN'T HAVE MONEY TO GET MORE.: NEVER TRUE

## 2023-03-17 SDOH — ECONOMIC STABILITY: INCOME INSECURITY: HOW HARD IS IT FOR YOU TO PAY FOR THE VERY BASICS LIKE FOOD, HOUSING, MEDICAL CARE, AND HEATING?: NOT HARD AT ALL

## 2023-03-17 SDOH — ECONOMIC STABILITY: FOOD INSECURITY: WITHIN THE PAST 12 MONTHS, YOU WORRIED THAT YOUR FOOD WOULD RUN OUT BEFORE YOU GOT MONEY TO BUY MORE.: NEVER TRUE

## 2023-03-17 ASSESSMENT — PATIENT HEALTH QUESTIONNAIRE - PHQ9
1. LITTLE INTEREST OR PLEASURE IN DOING THINGS: 0
2. FEELING DOWN, DEPRESSED OR HOPELESS: 0
SUM OF ALL RESPONSES TO PHQ QUESTIONS 1-9: 0
DEPRESSION UNABLE TO ASSESS: FUNCTIONAL CAPACITY MOTIVATION LIMITS ACCURACY
SUM OF ALL RESPONSES TO PHQ9 QUESTIONS 1 & 2: 0

## 2023-03-17 NOTE — PROGRESS NOTES
3/17/23  Rodri Cordero : 1990 Sex: male  Age 33 y.o. Subjective:  Chief Complaint   Patient presents with    Pharyngitis     Has two spots on back of tongue noticed        HPI:   Rodri Cordero , 33 y.o. male presents to the clinic for evaluation of sore throat x 1 day. The patient also reports spots on back of tongue. The patient has taken oral care (brush and floss) for symptoms. The patient reports no change symptoms over time. The patient denies ill exposure, patient works in hospital. The patient has hx of COVID-19. The patient denies acute loss of taste and smell, headache, sinus congestion, cough, rash, and fever. The patient also denies chest pain, abdominal pain, shortness of breath, wheezing, and nausea / vomiting / diarrhea. ROS:   Unless otherwise stated in this report the patient's positive and negative responses for review of systems for constitutional, eyes, ENT, cardiovascular, respiratory, gastrointestinal, neurological, , musculoskeletal, and integument systems and related systems to the presenting problem are either stated in the history of present illness or were not pertinent or were negative for the symptoms and/or complaints related to the presenting medical problem. Positives and pertinent negatives as per HPI. All others reviewed and are negative.       PMH:     Past Medical History:   Diagnosis Date    Allergic rhinitis     Asthma, exercise induced     COVID 2022    Osteoarthritis     Sacrococcygeal pilonidal cyst 2019       Past Surgical History:   Procedure Laterality Date    FRACTURE SURGERY Right     hand    PILONIDAL CYST EXCISION N/A 10/4/2019    PILONIDAL CYSTECTOMY performed by Heide Da Silva MD at [facility]         Family History   Problem Relation Age of Onset    High Cholesterol Mother     High Blood Pressure Mother     Hypothyroidism Mother Hypotension Father     Cancer Father         squamous cell skin cancer    High Blood Pressure Brother     Obesity Brother     Stroke Maternal Grandmother     Diabetes Maternal Grandmother     Other Maternal Grandmother         blood clots    Diabetes Maternal Grandfather     Heart Disease Maternal Grandfather     Cancer Maternal Grandfather         skin    Kidney Disease Maternal Grandfather     Heart Disease Paternal Grandmother     Prostate Cancer Paternal Grandfather        Medications:     Current Outpatient Medications:     famotidine (PEPCID) 20 MG tablet, Take 20 mg by mouth nightly as needed, Disp: , Rfl:     acetaminophen (TYLENOL) 500 MG tablet, Take 1,000 mg by mouth every 6 hours as needed for Pain, Disp: , Rfl:     Allergies: Allergies   Allergen Reactions    Penicillins      Dad and grandfather is allergic    Prelone [Prednisolone Anhydrous]      hallucinations    Cleocin [Clindamycin Hcl] Rash       Social History:     Social History     Tobacco Use    Smoking status: Never    Smokeless tobacco: Never   Vaping Use    Vaping Use: Never used   Substance Use Topics    Alcohol use: Yes     Alcohol/week: 1.0 standard drink     Types: 1 Cans of beer per week     Comment: every couple of weeks    Drug use: No       Physical Exam:     Vitals:    03/17/23 1100   BP: (!) 152/79   Pulse: 92   Resp: 17   Temp: 97.4 °F (36.3 °C)   TempSrc: Temporal   SpO2: 99%   Weight: 154 lb (69.9 kg)   Height: 5' 10\" (1.778 m)       Physical Exam (PE)    Physical Exam  Vitals and nursing note reviewed. Constitutional:       Appearance: He is well-developed. HENT:      Head: Normocephalic and atraumatic. Right Ear: Hearing and external ear normal. A middle ear effusion is present. Left Ear: Hearing and external ear normal. A middle ear effusion is present. Mouth/Throat:      Pharynx: Posterior oropharyngeal erythema present.       Comments: Post nasal drip   Eyes:      Pupils: Pupils are equal, round, and reactive to light.   Cardiovascular:      Rate and Rhythm: Normal rate and regular rhythm.      Heart sounds: Normal heart sounds. No murmur heard.  Pulmonary:      Effort: Pulmonary effort is normal. No respiratory distress.      Breath sounds: Normal breath sounds. No wheezing or rales.   Abdominal:      General: Bowel sounds are normal.      Palpations: Abdomen is soft.      Tenderness: There is no abdominal tenderness. There is no guarding or rebound.   Musculoskeletal:      Cervical back: Normal range of motion and neck supple.   Skin:     General: Skin is warm and dry.   Neurological:      Mental Status: He is alert and oriented to person, place, and time.      Cranial Nerves: No cranial nerve deficit.      Coordination: Coordination normal.        Testing:   (All laboratory and radiology results have been personally reviewed by myself)  Labs:  No results found for this visit on 03/17/23.    Imaging:  All Radiology results interpreted by Radiologist unless otherwise noted.  No orders to display       Assessment / Plan:   The patient's vitals, allergies, medications, and past medical history have been reviewed.  Jerzy was seen today for pharyngitis.    Diagnoses and all orders for this visit:    Sore throat      - Disposition: Home    - Educational material printed for patient's review and were included in patient instructions. After Visit Summary was given to patient at the end of visit.    - Patient advised to take OTC Claritin. Rapid Strep swab obtained and negative. Encouraged oral fluids and rest. Discussed symptomatic treatments with patient today. The patient is to schedule a follow-up with PCP in the next 2-3 days for reevaluation. Red flag symptoms were also discussed with the patient today. If symptoms worsen the patient is to go directly to the emergency department for reevaluation and treatment. Pt verbalizes understanding and is in agreement with plan of care. All questions  answered. SIGNATURE: FREEMAN Melendez - CNP      *NOTE: This report was transcribed using voice recognition software. Every effort was made to ensure accuracy; however, inadvertent computerized transcription errors may be present.

## 2023-03-23 ENCOUNTER — OFFICE VISIT (OUTPATIENT)
Dept: FAMILY MEDICINE CLINIC | Age: 33
End: 2023-03-23
Payer: COMMERCIAL

## 2023-03-23 VITALS
BODY MASS INDEX: 22.33 KG/M2 | DIASTOLIC BLOOD PRESSURE: 60 MMHG | TEMPERATURE: 98.4 F | HEART RATE: 94 BPM | WEIGHT: 156 LBS | HEIGHT: 70 IN | OXYGEN SATURATION: 98 % | RESPIRATION RATE: 16 BRPM | SYSTOLIC BLOOD PRESSURE: 112 MMHG

## 2023-03-23 DIAGNOSIS — K92.1 BLACK TARRY STOOLS: ICD-10-CM

## 2023-03-23 DIAGNOSIS — K21.00 GASTROESOPHAGEAL REFLUX DISEASE WITH ESOPHAGITIS WITHOUT HEMORRHAGE: Primary | ICD-10-CM

## 2023-03-23 DIAGNOSIS — K21.00 GASTROESOPHAGEAL REFLUX DISEASE WITH ESOPHAGITIS WITHOUT HEMORRHAGE: ICD-10-CM

## 2023-03-23 PROBLEM — Z72.51 HIGH RISK HETEROSEXUAL BEHAVIOR: Status: RESOLVED | Noted: 2022-04-20 | Resolved: 2023-03-23
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
## 2023-03-23 LAB
ALBUMIN SERPL-MCNC: 5.1 G/DL (ref 3.5–5.2)
ALP SERPL-CCNC: 60 U/L (ref 40–129)
ALT SERPL-CCNC: 27 U/L (ref 0–40)
ANION GAP SERPL CALCULATED.3IONS-SCNC: 11 MMOL/L (ref 7–16)
AST SERPL-CCNC: 16 U/L (ref 0–39)
BASOPHILS # BLD: 0.04 E9/L (ref 0–0.2)
BASOPHILS NFR BLD: 0.8 % (ref 0–2)
BILIRUB SERPL-MCNC: 0.5 MG/DL (ref 0–1.2)
BUN SERPL-MCNC: 11 MG/DL (ref 6–20)
CALCIUM SERPL-MCNC: 9.9 MG/DL (ref 8.6–10.2)
CHLORIDE SERPL-SCNC: 104 MMOL/L (ref 98–107)
CO2 SERPL-SCNC: 26 MMOL/L (ref 22–29)
CREAT SERPL-MCNC: 0.8 MG/DL (ref 0.7–1.2)
EOSINOPHIL # BLD: 0.01 E9/L (ref 0.05–0.5)
EOSINOPHIL NFR BLD: 0.2 % (ref 0–6)
ERYTHROCYTE [DISTWIDTH] IN BLOOD BY AUTOMATED COUNT: 12 FL (ref 11.5–15)
GLUCOSE SERPL-MCNC: 111 MG/DL (ref 74–99)
HCT VFR BLD AUTO: 47.9 % (ref 37–54)
HGB BLD-MCNC: 16 G/DL (ref 12.5–16.5)
IMM GRANULOCYTES # BLD: 0.01 E9/L
IMM GRANULOCYTES NFR BLD: 0.2 % (ref 0–5)
LYMPHOCYTES # BLD: 1.1 E9/L (ref 1.5–4)
LYMPHOCYTES NFR BLD: 23.2 % (ref 20–42)
MCH RBC QN AUTO: 28.1 PG (ref 26–35)
MCHC RBC AUTO-ENTMCNC: 33.4 % (ref 32–34.5)
MCV RBC AUTO: 84 FL (ref 80–99.9)
MONOCYTES # BLD: 0.33 E9/L (ref 0.1–0.95)
MONOCYTES NFR BLD: 6.9 % (ref 2–12)
NEUTROPHILS # BLD: 3.26 E9/L (ref 1.8–7.3)
NEUTS SEG NFR BLD: 68.7 % (ref 43–80)
PLATELET # BLD AUTO: 249 E9/L (ref 130–450)
PMV BLD AUTO: 10.8 FL (ref 7–12)
POTASSIUM SERPL-SCNC: 4.4 MMOL/L (ref 3.5–5)
PROT SERPL-MCNC: 7.6 G/DL (ref 6.4–8.3)
RBC # BLD AUTO: 5.7 E12/L (ref 3.8–5.8)
SODIUM SERPL-SCNC: 141 MMOL/L (ref 132–146)
WBC # BLD: 4.8 E9/L (ref 4.5–11.5)

## 2023-03-23 PROCEDURE — 99214 OFFICE O/P EST MOD 30 MIN: CPT | Performed by: NURSE PRACTITIONER

## 2023-03-23 RX ORDER — OMEPRAZOLE 40 MG/1
40 CAPSULE, DELAYED RELEASE ORAL
Qty: 90 CAPSULE | Refills: 0 | Status: SHIPPED | OUTPATIENT
Start: 2023-03-23

## 2023-03-23 RX ORDER — ALPRAZOLAM 0.5 MG/1
0.5 TABLET ORAL
COMMUNITY
Start: 2022-11-04

## 2023-03-23 NOTE — ASSESSMENT & PLAN NOTE
New  start Omeprazole 40 mg daily one hour before breakfast. Continue Pepcid 20 mg nightly if needed. Referral to GI  Hemoccult negative.

## 2023-03-23 NOTE — PROGRESS NOTES
mood and affect, answers questions appropriately    I have reviewed my findings and recommendations with Gloria Sampson.     Navjot Arteaga, APRN - CNP, NP-C, FNP-BC

## 2023-03-23 NOTE — ASSESSMENT & PLAN NOTE
uncontrolled,  start Omeprazole 40 mg daily one hour before breakfast. Continue Pepcid 20 mg nightly if needed.  Referral to GI  Hemoccult negative.   -Discussed elevated the HOB 30 degrees  -Discussed limiting spicy, fatty, greasy foods  -Discussed limit caffeine consumption to 1 - 2 cups daily  -Discussed waiting at least 3 - 4 hours before going to bed after eating  -Discussed not to eat and bend over immediately or lift heavy items.  -Discussed weight reduction if needed even 5 - 10 pounds.  -Discussed taking medications 1 hour prior to eating.   -Stay well hydrated

## 2023-06-26 ENCOUNTER — OFFICE VISIT (OUTPATIENT)
Dept: FAMILY MEDICINE CLINIC | Age: 33
End: 2023-06-26
Payer: COMMERCIAL

## 2023-06-26 VITALS
DIASTOLIC BLOOD PRESSURE: 74 MMHG | HEART RATE: 91 BPM | BODY MASS INDEX: 23.62 KG/M2 | RESPIRATION RATE: 16 BRPM | SYSTOLIC BLOOD PRESSURE: 132 MMHG | HEIGHT: 70 IN | WEIGHT: 165 LBS | OXYGEN SATURATION: 97 % | TEMPERATURE: 97.6 F

## 2023-06-26 DIAGNOSIS — Z72.89 OTHER PROBLEMS RELATED TO LIFESTYLE: ICD-10-CM

## 2023-06-26 DIAGNOSIS — Z00.00 ENCOUNTER FOR WELL ADULT EXAM WITHOUT ABNORMAL FINDINGS: Primary | ICD-10-CM

## 2023-06-26 DIAGNOSIS — Z11.59 NEED FOR HEPATITIS C SCREENING TEST: ICD-10-CM

## 2023-06-26 DIAGNOSIS — K21.00 GASTROESOPHAGEAL REFLUX DISEASE WITH ESOPHAGITIS WITHOUT HEMORRHAGE: ICD-10-CM

## 2023-06-26 DIAGNOSIS — Z13.220 SCREENING FOR LIPID DISORDERS: ICD-10-CM

## 2023-06-26 DIAGNOSIS — Z00.00 ENCOUNTER FOR WELL ADULT EXAM WITHOUT ABNORMAL FINDINGS: ICD-10-CM

## 2023-06-26 LAB
ALBUMIN SERPL-MCNC: 4.9 G/DL (ref 3.5–5.2)
ALP SERPL-CCNC: 62 U/L (ref 40–129)
ALT SERPL-CCNC: 44 U/L (ref 0–40)
ANION GAP SERPL CALCULATED.3IONS-SCNC: 9 MMOL/L (ref 7–16)
AST SERPL-CCNC: 26 U/L (ref 0–39)
BASOPHILS # BLD: 0.04 E9/L (ref 0–0.2)
BASOPHILS NFR BLD: 0.8 % (ref 0–2)
BILIRUB SERPL-MCNC: 0.6 MG/DL (ref 0–1.2)
BUN SERPL-MCNC: 13 MG/DL (ref 6–20)
CALCIUM SERPL-MCNC: 9.8 MG/DL (ref 8.6–10.2)
CHLORIDE SERPL-SCNC: 104 MMOL/L (ref 98–107)
CHOLESTEROL, TOTAL: 183 MG/DL (ref 0–199)
CO2 SERPL-SCNC: 27 MMOL/L (ref 22–29)
CREAT SERPL-MCNC: 0.9 MG/DL (ref 0.7–1.2)
EOSINOPHIL # BLD: 0.06 E9/L (ref 0.05–0.5)
EOSINOPHIL NFR BLD: 1.2 % (ref 0–6)
ERYTHROCYTE [DISTWIDTH] IN BLOOD BY AUTOMATED COUNT: 12.5 FL (ref 11.5–15)
GLUCOSE SERPL-MCNC: 90 MG/DL (ref 74–99)
HCT VFR BLD AUTO: 48.9 % (ref 37–54)
HDLC SERPL-MCNC: 59 MG/DL
HGB BLD-MCNC: 16 G/DL (ref 12.5–16.5)
IMM GRANULOCYTES # BLD: 0.02 E9/L
IMM GRANULOCYTES NFR BLD: 0.4 % (ref 0–5)
LDLC SERPL CALC-MCNC: 106 MG/DL (ref 0–99)
LYMPHOCYTES # BLD: 1.65 E9/L (ref 1.5–4)
LYMPHOCYTES NFR BLD: 33.7 % (ref 20–42)
MCH RBC QN AUTO: 27.8 PG (ref 26–35)
MCHC RBC AUTO-ENTMCNC: 32.7 % (ref 32–34.5)
MCV RBC AUTO: 84.9 FL (ref 80–99.9)
MONOCYTES # BLD: 0.39 E9/L (ref 0.1–0.95)
MONOCYTES NFR BLD: 8 % (ref 2–12)
NEUTROPHILS # BLD: 2.74 E9/L (ref 1.8–7.3)
NEUTS SEG NFR BLD: 55.9 % (ref 43–80)
PLATELET # BLD AUTO: 216 E9/L (ref 130–450)
PMV BLD AUTO: 10.8 FL (ref 7–12)
POTASSIUM SERPL-SCNC: 4.5 MMOL/L (ref 3.5–5)
PROT SERPL-MCNC: 7.5 G/DL (ref 6.4–8.3)
RBC # BLD AUTO: 5.76 E12/L (ref 3.8–5.8)
SODIUM SERPL-SCNC: 140 MMOL/L (ref 132–146)
TRIGL SERPL-MCNC: 91 MG/DL (ref 0–149)
VLDLC SERPL CALC-MCNC: 18 MG/DL
WBC # BLD: 4.9 E9/L (ref 4.5–11.5)

## 2023-06-26 PROCEDURE — 99395 PREV VISIT EST AGE 18-39: CPT | Performed by: NURSE PRACTITIONER

## 2023-06-26 RX ORDER — ESOMEPRAZOLE MAGNESIUM 20 MG/1
20 FOR SUSPENSION ORAL DAILY
Qty: 30 EACH | Refills: 6 | Status: SHIPPED | OUTPATIENT
Start: 2023-06-26

## 2023-06-26 RX ORDER — ESOMEPRAZOLE MAGNESIUM 20 MG/1
20 FOR SUSPENSION ORAL DAILY
COMMUNITY
End: 2023-06-26 | Stop reason: SDUPTHER

## 2023-06-26 ASSESSMENT — ENCOUNTER SYMPTOMS
FACIAL SWELLING: 0
CHEST TIGHTNESS: 0
COUGH: 1
WHEEZING: 0
BACK PAIN: 0
NAUSEA: 0
ABDOMINAL PAIN: 0
VOMITING: 0
CONSTIPATION: 0
VOICE CHANGE: 0
DIARRHEA: 0
COLOR CHANGE: 0
RHINORRHEA: 0
SHORTNESS OF BREATH: 0
SINUS PAIN: 0
TROUBLE SWALLOWING: 0
SORE THROAT: 0
SINUS PRESSURE: 0

## 2023-06-27 LAB — HCV AB SERPL QL IA: NORMAL

## 2023-07-07 ENCOUNTER — HOSPITAL ENCOUNTER (OUTPATIENT)
Dept: GENERAL RADIOLOGY | Age: 33
End: 2023-07-07
Payer: COMMERCIAL

## 2023-07-07 ENCOUNTER — HOSPITAL ENCOUNTER (OUTPATIENT)
Age: 33
Discharge: HOME | End: 2023-07-07
Payer: COMMERCIAL

## 2023-07-07 DIAGNOSIS — R19.4 ALTERED BOWEL HABITS: ICD-10-CM

## 2023-07-07 PROCEDURE — 74018 RADEX ABDOMEN 1 VIEW: CPT

## 2023-09-25 ENCOUNTER — HOSPITAL ENCOUNTER (OUTPATIENT)
Age: 33
Discharge: HOME | End: 2023-09-27
Payer: COMMERCIAL

## 2023-09-25 ENCOUNTER — HOSPITAL ENCOUNTER (OUTPATIENT)
Dept: GENERAL RADIOLOGY | Age: 33
Discharge: HOME | End: 2023-09-27
Payer: COMMERCIAL

## 2023-09-25 DIAGNOSIS — R10.84 ABDOMINAL PAIN, GENERALIZED: ICD-10-CM

## 2023-09-25 PROCEDURE — 74018 RADEX ABDOMEN 1 VIEW: CPT

## 2023-11-21 ENCOUNTER — APPOINTMENT (OUTPATIENT)
Dept: CT IMAGING | Age: 33
End: 2023-11-21
Payer: COMMERCIAL

## 2023-11-21 ENCOUNTER — NURSE TRIAGE (OUTPATIENT)
Dept: OTHER | Facility: CLINIC | Age: 33
End: 2023-11-21

## 2023-11-21 ENCOUNTER — HOSPITAL ENCOUNTER (EMERGENCY)
Age: 33
Discharge: HOME | End: 2023-11-21
Attending: EMERGENCY MEDICINE
Payer: COMMERCIAL

## 2023-11-21 ENCOUNTER — APPOINTMENT (OUTPATIENT)
Dept: ULTRASOUND IMAGING | Age: 33
End: 2023-11-21
Payer: COMMERCIAL

## 2023-11-21 VITALS
SYSTOLIC BLOOD PRESSURE: 142 MMHG | TEMPERATURE: 98.1 F | OXYGEN SATURATION: 100 % | WEIGHT: 174 LBS | BODY MASS INDEX: 24.97 KG/M2 | DIASTOLIC BLOOD PRESSURE: 86 MMHG | RESPIRATION RATE: 16 BRPM | HEART RATE: 86 BPM

## 2023-11-21 DIAGNOSIS — R10.11 RIGHT UPPER QUADRANT ABDOMINAL PAIN: Primary | ICD-10-CM

## 2023-11-21 LAB
ALBUMIN SERPL-MCNC: 5.1 G/DL (ref 3.5–5.2)
ALP SERPL-CCNC: 62 U/L (ref 40–129)
ALT SERPL-CCNC: 55 U/L (ref 0–40)
ANION GAP SERPL CALCULATED.3IONS-SCNC: 12 MMOL/L (ref 7–16)
AST SERPL-CCNC: 24 U/L (ref 0–39)
BASOPHILS # BLD: 0.05 K/UL (ref 0–0.2)
BASOPHILS NFR BLD: 1 % (ref 0–2)
BILIRUB SERPL-MCNC: 0.4 MG/DL (ref 0–1.2)
BUN SERPL-MCNC: 10 MG/DL (ref 6–20)
CALCIUM SERPL-MCNC: 9.8 MG/DL (ref 8.6–10.2)
CHLORIDE SERPL-SCNC: 100 MMOL/L (ref 98–107)
CO2 SERPL-SCNC: 27 MMOL/L (ref 22–29)
CREAT SERPL-MCNC: 0.8 MG/DL (ref 0.7–1.2)
EOSINOPHIL # BLD: 0.06 K/UL (ref 0.05–0.5)
EOSINOPHILS RELATIVE PERCENT: 1 % (ref 0–6)
ERYTHROCYTE [DISTWIDTH] IN BLOOD BY AUTOMATED COUNT: 12.3 % (ref 11.5–15)
GFR SERPL CREATININE-BSD FRML MDRD: >60 ML/MIN/1.73M2
GLUCOSE SERPL-MCNC: 102 MG/DL (ref 74–99)
HCT VFR BLD AUTO: 46.5 % (ref 37–54)
HGB BLD-MCNC: 16.1 G/DL (ref 12.5–16.5)
IMM GRANULOCYTES # BLD AUTO: 0.04 K/UL (ref 0–0.58)
IMM GRANULOCYTES NFR BLD: 0 % (ref 0–5)
LIPASE SERPL-CCNC: 32 U/L (ref 13–60)
LYMPHOCYTES NFR BLD: 2.33 K/UL (ref 1.5–4)
LYMPHOCYTES RELATIVE PERCENT: 25 % (ref 20–42)
MCH RBC QN AUTO: 27.6 PG (ref 26–35)
MCHC RBC AUTO-ENTMCNC: 34.6 G/DL (ref 32–34.5)
MCV RBC AUTO: 79.8 FL (ref 80–99.9)
MONOCYTES NFR BLD: 0.55 K/UL (ref 0.1–0.95)
MONOCYTES NFR BLD: 6 % (ref 2–12)
NEUTROPHILS NFR BLD: 68 % (ref 43–80)
NEUTS SEG NFR BLD: 6.26 K/UL (ref 1.8–7.3)
PLATELET # BLD AUTO: 257 K/UL (ref 130–450)
PMV BLD AUTO: 10 FL (ref 7–12)
POTASSIUM SERPL-SCNC: 3.8 MMOL/L (ref 3.5–5)
PROT SERPL-MCNC: 7.3 G/DL (ref 6.4–8.3)
RBC # BLD AUTO: 5.83 M/UL (ref 3.8–5.8)
SODIUM SERPL-SCNC: 139 MMOL/L (ref 132–146)
WBC OTHER # BLD: 9.3 K/UL (ref 4.5–11.5)

## 2023-11-21 PROCEDURE — 2580000003 HC RX 258: Performed by: EMERGENCY MEDICINE

## 2023-11-21 PROCEDURE — 96374 THER/PROPH/DIAG INJ IV PUSH: CPT

## 2023-11-21 PROCEDURE — 80053 COMPREHEN METABOLIC PANEL: CPT

## 2023-11-21 PROCEDURE — 6360000004 HC RX CONTRAST MEDICATION: Performed by: RADIOLOGY

## 2023-11-21 PROCEDURE — 74177 CT ABD & PELVIS W/CONTRAST: CPT

## 2023-11-21 PROCEDURE — 83690 ASSAY OF LIPASE: CPT

## 2023-11-21 PROCEDURE — 6360000002 HC RX W HCPCS: Performed by: EMERGENCY MEDICINE

## 2023-11-21 PROCEDURE — 99285 EMERGENCY DEPT VISIT HI MDM: CPT

## 2023-11-21 PROCEDURE — 85025 COMPLETE CBC W/AUTO DIFF WBC: CPT

## 2023-11-21 PROCEDURE — 6370000000 HC RX 637 (ALT 250 FOR IP): Performed by: EMERGENCY MEDICINE

## 2023-11-21 PROCEDURE — 76705 ECHO EXAM OF ABDOMEN: CPT

## 2023-11-21 RX ORDER — KETOROLAC TROMETHAMINE 15 MG/ML
15 INJECTION, SOLUTION INTRAMUSCULAR; INTRAVENOUS ONCE
Status: COMPLETED | OUTPATIENT
Start: 2023-11-21 | End: 2023-11-21

## 2023-11-21 RX ORDER — 0.9 % SODIUM CHLORIDE 0.9 %
1000 INTRAVENOUS SOLUTION INTRAVENOUS ONCE
Status: COMPLETED | OUTPATIENT
Start: 2023-11-21 | End: 2023-11-21

## 2023-11-21 RX ADMIN — IOPAMIDOL 75 ML: 755 INJECTION, SOLUTION INTRAVENOUS at 17:45

## 2023-11-21 RX ADMIN — SODIUM CHLORIDE 1000 ML: 9 INJECTION, SOLUTION INTRAVENOUS at 15:30

## 2023-11-21 RX ADMIN — ALUMINUM HYDROXIDE, MAGNESIUM HYDROXIDE, AND SIMETHICONE: 200; 200; 20 SUSPENSION ORAL at 15:32

## 2023-11-21 RX ADMIN — KETOROLAC TROMETHAMINE 15 MG: 15 INJECTION, SOLUTION INTRAMUSCULAR; INTRAVENOUS at 17:35

## 2023-11-21 ASSESSMENT — LIFESTYLE VARIABLES
HOW OFTEN DO YOU HAVE A DRINK CONTAINING ALCOHOL: NEVER
HOW MANY STANDARD DRINKS CONTAINING ALCOHOL DO YOU HAVE ON A TYPICAL DAY: PATIENT DOES NOT DRINK

## 2023-11-21 ASSESSMENT — PAIN DESCRIPTION - ORIENTATION: ORIENTATION: RIGHT;UPPER

## 2023-11-21 ASSESSMENT — PAIN DESCRIPTION - DESCRIPTORS: DESCRIPTORS: BURNING;ACHING;SHARP

## 2023-11-21 ASSESSMENT — PAIN SCALES - GENERAL: PAINLEVEL_OUTOF10: 4

## 2023-11-21 ASSESSMENT — PAIN DESCRIPTION - LOCATION: LOCATION: ABDOMEN

## 2023-11-21 NOTE — TELEPHONE ENCOUNTER
Location of patient: [place]    Subjective: Caller states \"I have been having right upper quadrant abdominal pain for a couple of days. \"     Current Symptoms: right upper abdominal pain      Onset: Worse is past 3 days      Pain Severity: 4/10; sharp; intermittent    Temperature: 97F      Denies - vomiting / diarrhea / black bloody or tarry stool / difficulty with urination    Recommended disposition: Go to ED/[facility] Now (Or to Office with PCP Approval)  States messaged primary care and was advised to go to an [facility] or ED    Care advice provided, patient verbalizes understanding; denies any other questions or concerns; instructed to call back for any new or worsening symptoms. Patient/caller agrees to proceed to [facility] Emergency Department    Attention Provider: Thank you for allowing me to participate in the care of your patient. The patient was connected to triage in response to symptoms provided. Please do not respond through this encounter as the response is not directed to a shared pool.       Reason for Disposition   MILD TO MODERATE constant pain lasting > 2 hours    Protocols used: Abdominal Pain - Male-ADULT-[place]

## 2023-12-18 PROBLEM — Z91.018 FOOD ALLERGY: Status: ACTIVE | Noted: 2023-12-18

## 2024-03-11 ENCOUNTER — OFFICE VISIT (OUTPATIENT)
Dept: FAMILY MEDICINE CLINIC | Age: 34
End: 2024-03-11
Payer: COMMERCIAL

## 2024-03-11 VITALS
HEIGHT: 70 IN | OXYGEN SATURATION: 97 % | WEIGHT: 174 LBS | BODY MASS INDEX: 24.91 KG/M2 | DIASTOLIC BLOOD PRESSURE: 68 MMHG | TEMPERATURE: 99.9 F | HEART RATE: 90 BPM | RESPIRATION RATE: 16 BRPM | SYSTOLIC BLOOD PRESSURE: 128 MMHG

## 2024-03-11 DIAGNOSIS — R51.9 NONINTRACTABLE HEADACHE, UNSPECIFIED CHRONICITY PATTERN, UNSPECIFIED HEADACHE TYPE: ICD-10-CM

## 2024-03-11 DIAGNOSIS — J06.9 VIRAL URI: ICD-10-CM

## 2024-03-11 DIAGNOSIS — R09.81 SINUS CONGESTION: ICD-10-CM

## 2024-03-11 DIAGNOSIS — R50.9 FEVER, UNSPECIFIED FEVER CAUSE: Primary | ICD-10-CM

## 2024-03-11 LAB
INFLUENZA A ANTIBODY: NEGATIVE
INFLUENZA B ANTIBODY: NEGATIVE
Lab: NORMAL
PERFORMING INSTRUMENT: NORMAL
QC PASS/FAIL: NORMAL
SARS-COV-2, POC: NORMAL

## 2024-03-11 PROCEDURE — 87426 SARSCOV CORONAVIRUS AG IA: CPT | Performed by: NURSE PRACTITIONER

## 2024-03-11 PROCEDURE — 87804 INFLUENZA ASSAY W/OPTIC: CPT | Performed by: NURSE PRACTITIONER

## 2024-03-11 PROCEDURE — 99213 OFFICE O/P EST LOW 20 MIN: CPT | Performed by: NURSE PRACTITIONER

## 2024-03-11 ASSESSMENT — ENCOUNTER SYMPTOMS
RHINORRHEA: 1
COUGH: 0
SHORTNESS OF BREATH: 0
WHEEZING: 0
SORE THROAT: 0
DIARRHEA: 1
VOMITING: 0
SINUS PRESSURE: 1
NAUSEA: 0
SINUS PAIN: 0

## 2024-03-11 ASSESSMENT — PATIENT HEALTH QUESTIONNAIRE - PHQ9
SUM OF ALL RESPONSES TO PHQ9 QUESTIONS 1 & 2: 0
1. LITTLE INTEREST OR PLEASURE IN DOING THINGS: 0
SUM OF ALL RESPONSES TO PHQ QUESTIONS 1-9: 0
SUM OF ALL RESPONSES TO PHQ QUESTIONS 1-9: 0
2. FEELING DOWN, DEPRESSED OR HOPELESS: 0
SUM OF ALL RESPONSES TO PHQ QUESTIONS 1-9: 0
SUM OF ALL RESPONSES TO PHQ QUESTIONS 1-9: 0

## 2024-03-11 NOTE — ASSESSMENT & PLAN NOTE
All testing negative Covid, Influenza A/B at this time stay well hydrated, hold the tylenol unless fever is over 101.9. Rest as much as possible. Improved since symptoms started.    Maintain Cardiac Health Continue present medications follow up with Cardiologist in 2 weeks   Restricted use with no heavy lifting of affected arm for 48 hours.  No submerging the arm in water for 48 hours.  You may start showering today.  Call your doctor for any bleeding, swelling, loss of sensation in the hand or fingers, or fingers turning blue.  If heavy bleeding or large lumps form, hold pressure at the spot and come to the Emergency Room.

## 2024-03-11 NOTE — PROGRESS NOTES
OFFICE PROGRESS NOTE  [facility] [place] [facility]  [address] [place] [address]  Dept: [phone]   Chief Complaint   Patient presents with    Sinus Problem    Fever    Headache       ASSESSMENT/PLAN   1. Fever, unspecified fever cause  Assessment & Plan:   All testing negative Covid, Influenza A/B at this time stay well hydrated, hold the tylenol unless fever is over 101.9. Rest as much as possible.   Orders:  -     POCT COVID-19, Antigen  -     POCT Influenza A/B  2. Nonintractable headache, unspecified chronicity pattern, unspecified headache type  Assessment & Plan:   All testing negative Covid, Influenza A/B at this time stay well hydrated, hold the tylenol unless fever is over 101.9. Rest as much as possible. Improved since symptoms started.   Orders:  -     POCT COVID-19, Antigen  -     POCT Influenza A/B  3. Sinus congestion  -     POCT COVID-19, Antigen  -     POCT Influenza A/B  4. Viral URI  Assessment & Plan:   All testing negative Covid, Influenza A/B at this time stay well hydrated, hold the tylenol unless fever is over 101.9. Rest as much as possible.        Reviewed labs:   Reviewed notes from   Reviewed radiology CT abdomen unremarkable, US liver     Discussed taking medications as directed and adverse effects    Return if symptoms worsen or fail to improve, for keep follow up .     HPI:   Here today with fever started on Friday, he also had headache which is very mild with some sinus pressure today, complains of lymph nodes swollen on the right side. Most nasal secretions are clear occasional yellow. He has been doing SWI, using tylenol for the fever. He feels much better than he did on Friday. His son was sick and they went to [facility] and his testing was all negative.     Current Outpatient Medications:     acetaminophen (TYLENOL) 500 MG tablet, Take 2 tablets by mouth every 6 hours as needed for Pain, Disp: , Rfl:     famotidine (PEPCID) 20 MG

## 2024-03-11 NOTE — ASSESSMENT & PLAN NOTE
All testing negative Covid, Influenza A/B at this time stay well hydrated, hold the tylenol unless fever is over 101.9. Rest as much as possible.

## 2024-03-16 ENCOUNTER — APPOINTMENT (OUTPATIENT)
Dept: GENERAL RADIOLOGY | Age: 34
End: 2024-03-16
Payer: COMMERCIAL

## 2024-03-16 ENCOUNTER — HOSPITAL ENCOUNTER (EMERGENCY)
Age: 34
Discharge: HOME | End: 2024-03-16
Attending: EMERGENCY MEDICINE
Payer: COMMERCIAL

## 2024-03-16 ENCOUNTER — NURSE TRIAGE (OUTPATIENT)
Dept: OTHER | Facility: CLINIC | Age: 34
End: 2024-03-16

## 2024-03-16 VITALS
DIASTOLIC BLOOD PRESSURE: 82 MMHG | TEMPERATURE: 99 F | RESPIRATION RATE: 18 BRPM | SYSTOLIC BLOOD PRESSURE: 160 MMHG | OXYGEN SATURATION: 97 % | HEART RATE: 103 BPM

## 2024-03-16 DIAGNOSIS — J10.1 INFLUENZA B: Primary | ICD-10-CM

## 2024-03-16 LAB
BILIRUB UR QL STRIP: NEGATIVE
CLARITY UR: CLEAR
COLOR UR: YELLOW
EPI CELLS #/AREA URNS HPF: ABNORMAL /HPF
GLUCOSE UR STRIP-MCNC: NEGATIVE MG/DL
HGB UR QL STRIP.AUTO: NEGATIVE
INFLUENZA A BY PCR: NOT DETECTED
INFLUENZA B BY PCR: DETECTED
KETONES UR STRIP-MCNC: NEGATIVE MG/DL
LEUKOCYTE ESTERASE UR QL STRIP: NEGATIVE
NITRITE UR QL STRIP: NEGATIVE
PH UR STRIP: 7.5 [PH] (ref 5–9)
PROT UR STRIP-MCNC: NEGATIVE MG/DL
RBC #/AREA URNS HPF: ABNORMAL /HPF
SARS-COV-2 RDRP RESP QL NAA+PROBE: NOT DETECTED
SP GR UR STRIP: <1.005 (ref 1–1.03)
SPECIMEN DESCRIPTION: NORMAL
UROBILINOGEN UR STRIP-ACNC: 0.2 EU/DL (ref 0–1)
WBC #/AREA URNS HPF: ABNORMAL /HPF

## 2024-03-16 PROCEDURE — 71046 X-RAY EXAM CHEST 2 VIEWS: CPT

## 2024-03-16 PROCEDURE — 87502 INFLUENZA DNA AMP PROBE: CPT

## 2024-03-16 PROCEDURE — 87635 SARS-COV-2 COVID-19 AMP PRB: CPT

## 2024-03-16 PROCEDURE — 96372 THER/PROPH/DIAG INJ SC/IM: CPT

## 2024-03-16 PROCEDURE — 81001 URINALYSIS AUTO W/SCOPE: CPT

## 2024-03-16 PROCEDURE — 6360000002 HC RX W HCPCS: Performed by: EMERGENCY MEDICINE

## 2024-03-16 PROCEDURE — 99284 EMERGENCY DEPT VISIT MOD MDM: CPT

## 2024-03-16 RX ORDER — KETOROLAC TROMETHAMINE 30 MG/ML
30 INJECTION, SOLUTION INTRAMUSCULAR; INTRAVENOUS ONCE
Status: COMPLETED | OUTPATIENT
Start: 2024-03-16 | End: 2024-03-16

## 2024-03-16 RX ADMIN — KETOROLAC TROMETHAMINE 30 MG: 30 INJECTION, SOLUTION INTRAMUSCULAR at 09:21

## 2024-03-16 ASSESSMENT — PAIN SCALES - GENERAL: PAINLEVEL_OUTOF10: 5

## 2024-03-16 ASSESSMENT — PAIN - FUNCTIONAL ASSESSMENT: PAIN_FUNCTIONAL_ASSESSMENT: 0-10

## 2024-03-16 NOTE — ED PROVIDER NOTES
[facility] EMERGENCY DEPARTMENT  EMERGENCY DEPARTMENT ENCOUNTER        Pt Name: Jerzy Roblero  MRN: [id number]  Birthdate 1990  Date of evaluation: 3/16/2024  Provider: Ovidio Fermin DO  PCP: Madison Maurice, FREEMAN - CNP  Note Started: 8:48 AM EDT 3/16/24    CHIEF COMPLAINT       Chief Complaint   Patient presents with    Cough    Fever    Headache     With body aches since Monday.  Fevers as hi as 102       HISTORY OF PRESENT ILLNESS: 1 or more Elements   History From: patient    Limitations to history : None    Jerzy Roblero is a 33 y.o. male who presents to the ED for evaluation of flulike symptoms.  Patient states for the past week he has had cough, congestion, body aches, fevers, and headache.  Saw his PCP couple days ago and was tested for COVID and flu which she states were both negative.  He denies shortness of breath.  No associated nausea, vomiting, or diarrhea.  No known sick contacts.  No reported dizziness, lightheadedness, or syncope.  Patient is non-smoker.  Reports has had fevers as high as 102 at home.  Has been taking Tylenol and staying is not letting up.  Patient not febrile upon arrival    Nursing Notes were all reviewed and agreed with or any disagreements were addressed in the HPI.      REVIEW OF EXTERNAL NOTE :        REVIEW OF SYSTEMS :           Positives and Pertinent negatives as per HPI.     SURGICAL HISTORY     Past Surgical History:   Procedure Laterality Date    FRACTURE SURGERY Right     hand    PILONIDAL CYST EXCISION N/A 10/4/2019    PILONIDAL CYSTECTOMY performed by Aldo Luna MD at [facility] OR    TONSILLECTOMY      TONSILLECTOMY AND ADENOIDECTOMY      WISDOM TOOTH EXTRACTION         CURRENTMEDICATIONS       Previous Medications    ACETAMINOPHEN (TYLENOL) 500 MG TABLET    Take 2 tablets by mouth every 6 hours as needed for Pain    FAMOTIDINE (PEPCID) 20 MG TABLET    Take 1 tablet by mouth nightly as needed       ALLERGIES

## 2024-03-16 NOTE — TELEPHONE ENCOUNTER
Reason for Disposition   Patient sounds very sick or weak to the triager    Protocols used: Lymph Nodes - Swollen-ADULT-[place]    Location of patient: [place]    Subjective: Caller states \"Fever for a week - 102.0's\"     Current Symptoms: Persistent Fever x 5-6 day, cough, headache, dark urine, right side swollen lymph node.    Onset:  5-6 days ago.  Seen in PCP's office.   Thought Viral.   Missed going back to his PCP on Friday.  Negative Flu and Covid.      Associated Symptoms: reduced fluid intake    Pain Severity: 4/10;      Temperature: 102.0     What has been tried: trying to drink fluids- but feels he is not getting enough  He is concerned for dehydration.    Recommended disposition: Go to ED Now    Care advice provided, patient verbalizes understanding; denies any other questions or concerns; instructed to call back for any new or worsening symptoms.    Patient/caller agrees to proceed to [facility]  Emergency Department    Attention Provider:  Thank you for allowing me to participate in the care of your patient.  The patient was connected to triage in response to symptoms provided.   Please do not respond through this encounter as the response is not directed to a shared pool.

## 2024-03-21 ENCOUNTER — APPOINTMENT (OUTPATIENT)
Dept: GENERAL RADIOLOGY | Age: 34
End: 2024-03-21
Payer: COMMERCIAL

## 2024-03-21 ENCOUNTER — NURSE TRIAGE (OUTPATIENT)
Dept: OTHER | Facility: CLINIC | Age: 34
End: 2024-03-21

## 2024-03-21 ENCOUNTER — APPOINTMENT (OUTPATIENT)
Dept: ULTRASOUND IMAGING | Age: 34
End: 2024-03-21
Payer: COMMERCIAL

## 2024-03-21 ENCOUNTER — HOSPITAL ENCOUNTER (OUTPATIENT)
Age: 34
Setting detail: OBSERVATION
Discharge: HOME | End: 2024-03-23
Attending: STUDENT IN AN ORGANIZED HEALTH CARE EDUCATION/TRAINING PROGRAM | Admitting: INTERNAL MEDICINE
Payer: COMMERCIAL

## 2024-03-21 ENCOUNTER — APPOINTMENT (OUTPATIENT)
Dept: CT IMAGING | Age: 34
End: 2024-03-21
Payer: COMMERCIAL

## 2024-03-21 DIAGNOSIS — R74.01 TRANSAMINITIS: Primary | ICD-10-CM

## 2024-03-21 PROBLEM — R79.89 ELEVATED LFTS: Status: ACTIVE | Noted: 2024-03-21

## 2024-03-21 PROBLEM — R74.8 ALKALINE PHOSPHATASE ELEVATION: Status: ACTIVE | Noted: 2024-03-21

## 2024-03-21 PROBLEM — J10.1 INFLUENZA B: Status: ACTIVE | Noted: 2024-03-21

## 2024-03-21 PROBLEM — R00.0 TACHYCARDIA: Status: ACTIVE | Noted: 2024-03-21

## 2024-03-21 PROBLEM — R59.0 LAD (LYMPHADENOPATHY) OF RIGHT CERVICAL REGION: Status: ACTIVE | Noted: 2024-03-21

## 2024-03-21 LAB
ALBUMIN SERPL-MCNC: 4.3 G/DL (ref 3.5–5.2)
ALP SERPL-CCNC: 495 U/L (ref 40–129)
ALT SERPL-CCNC: 682 U/L (ref 0–40)
ANION GAP SERPL CALCULATED.3IONS-SCNC: 13 MMOL/L (ref 7–16)
APAP SERPL-MCNC: <5 UG/ML (ref 10–30)
AST SERPL-CCNC: 293 U/L (ref 0–39)
BASOPHILS # BLD: 0 K/UL (ref 0–0.2)
BASOPHILS NFR BLD: 0 % (ref 0–2)
BILIRUB SERPL-MCNC: 0.9 MG/DL (ref 0–1.2)
BILIRUB UR QL STRIP: NEGATIVE
BUN SERPL-MCNC: 8 MG/DL (ref 6–20)
CALCIUM SERPL-MCNC: 9.4 MG/DL (ref 8.6–10.2)
CHLORIDE SERPL-SCNC: 100 MMOL/L (ref 98–107)
CK SERPL-CCNC: 54 U/L (ref 20–200)
CLARITY UR: CLEAR
CO2 SERPL-SCNC: 26 MMOL/L (ref 22–29)
COLOR UR: YELLOW
CREAT SERPL-MCNC: 0.9 MG/DL (ref 0.7–1.2)
EOSINOPHIL # BLD: 0 K/UL (ref 0.05–0.5)
EOSINOPHILS RELATIVE PERCENT: 0 % (ref 0–6)
ERYTHROCYTE [DISTWIDTH] IN BLOOD BY AUTOMATED COUNT: 12.6 % (ref 11.5–15)
ETHANOLAMINE SERPL-MCNC: <10 MG/DL
FERRITIN SERPL-MCNC: 686 NG/ML
GFR SERPL CREATININE-BSD FRML MDRD: >60 ML/MIN/1.73M2
GLUCOSE SERPL-MCNC: 103 MG/DL (ref 74–99)
GLUCOSE UR STRIP-MCNC: NEGATIVE MG/DL
HCT VFR BLD AUTO: 43.5 % (ref 37–54)
HETEROPH AB BLD QL IA: NEGATIVE
HGB BLD-MCNC: 14.6 G/DL (ref 12.5–16.5)
HGB UR QL STRIP.AUTO: NEGATIVE
INR PPP: 1.2
IRON SATN MFR SERPL: 21 % (ref 20–55)
IRON SERPL-MCNC: 59 UG/DL (ref 59–158)
KETONES UR STRIP-MCNC: NEGATIVE MG/DL
LEUKOCYTE ESTERASE UR QL STRIP: NEGATIVE
LYMPHOCYTES NFR BLD: 5.8 K/UL (ref 1.5–4)
LYMPHOCYTES RELATIVE PERCENT: 49 % (ref 20–42)
MCH RBC QN AUTO: 27.3 PG (ref 26–35)
MCHC RBC AUTO-ENTMCNC: 33.6 G/DL (ref 32–34.5)
MCV RBC AUTO: 81.3 FL (ref 80–99.9)
METAMYELOCYTES ABSOLUTE COUNT: 0.21 K/UL (ref 0–0.12)
METAMYELOCYTES: 2 % (ref 0–1)
MONOCYTES NFR BLD: 0.62 K/UL (ref 0.1–0.95)
MONOCYTES NFR BLD: 5 % (ref 2–12)
MYELOCYTES ABSOLUTE COUNT: 0.21 K/UL
MYELOCYTES: 2 %
NEUTROPHILS NFR BLD: 42 % (ref 43–80)
NEUTS SEG NFR BLD: 4.97 K/UL (ref 1.8–7.3)
NITRITE UR QL STRIP: NEGATIVE
PH UR STRIP: 6 [PH] (ref 5–9)
PLATELET # BLD AUTO: 206 K/UL (ref 130–450)
PMV BLD AUTO: 10.3 FL (ref 7–12)
POTASSIUM SERPL-SCNC: 3.8 MMOL/L (ref 3.5–5)
PROT SERPL-MCNC: 7.6 G/DL (ref 6.4–8.3)
PROT UR STRIP-MCNC: NEGATIVE MG/DL
PROTHROMBIN TIME: 13.1 SEC (ref 9.3–12.4)
RBC # BLD AUTO: 5.35 M/UL (ref 3.8–5.8)
RBC # BLD: NORMAL 10*6/UL
RBC #/AREA URNS HPF: ABNORMAL /HPF
SALICYLATES SERPL-MCNC: <0.3 MG/DL (ref 0–30)
SODIUM SERPL-SCNC: 139 MMOL/L (ref 132–146)
SP GR UR STRIP: <1.005 (ref 1–1.03)
SPECIMEN SOURCE: NORMAL
STREP A, MOLECULAR: NEGATIVE
TIBC SERPL-MCNC: 279 UG/DL (ref 250–450)
TOXIC TRICYCLIC SC,BLOOD: NEGATIVE
UROBILINOGEN UR STRIP-ACNC: 0.2 EU/DL (ref 0–1)
WBC #/AREA URNS HPF: ABNORMAL /HPF
WBC OTHER # BLD: 11.8 K/UL (ref 4.5–11.5)

## 2024-03-21 PROCEDURE — G0480 DRUG TEST DEF 1-7 CLASSES: HCPCS

## 2024-03-21 PROCEDURE — 74177 CT ABD & PELVIS W/CONTRAST: CPT

## 2024-03-21 PROCEDURE — 82550 ASSAY OF CK (CPK): CPT

## 2024-03-21 PROCEDURE — 96361 HYDRATE IV INFUSION ADD-ON: CPT

## 2024-03-21 PROCEDURE — 2580000003 HC RX 258: Performed by: PHYSICIAN ASSISTANT

## 2024-03-21 PROCEDURE — 70491 CT SOFT TISSUE NECK W/DYE: CPT

## 2024-03-21 PROCEDURE — 80307 DRUG TEST PRSMV CHEM ANLYZR: CPT

## 2024-03-21 PROCEDURE — G0378 HOSPITAL OBSERVATION PER HR: HCPCS

## 2024-03-21 PROCEDURE — 82728 ASSAY OF FERRITIN: CPT

## 2024-03-21 PROCEDURE — 71260 CT THORAX DX C+: CPT

## 2024-03-21 PROCEDURE — 86694 HERPES SIMPLEX NES ANTBDY: CPT

## 2024-03-21 PROCEDURE — 87086 URINE CULTURE/COLONY COUNT: CPT

## 2024-03-21 PROCEDURE — 87651 STREP A DNA AMP PROBE: CPT

## 2024-03-21 PROCEDURE — 96360 HYDRATION IV INFUSION INIT: CPT

## 2024-03-21 PROCEDURE — 80053 COMPREHEN METABOLIC PANEL: CPT

## 2024-03-21 PROCEDURE — 81001 URINALYSIS AUTO W/SCOPE: CPT

## 2024-03-21 PROCEDURE — 76705 ECHO EXAM OF ABDOMEN: CPT

## 2024-03-21 PROCEDURE — 80179 DRUG ASSAY SALICYLATE: CPT

## 2024-03-21 PROCEDURE — 99285 EMERGENCY DEPT VISIT HI MDM: CPT

## 2024-03-21 PROCEDURE — 86039 ANTINUCLEAR ANTIBODIES (ANA): CPT

## 2024-03-21 PROCEDURE — 80143 DRUG ASSAY ACETAMINOPHEN: CPT

## 2024-03-21 PROCEDURE — 80074 ACUTE HEPATITIS PANEL: CPT

## 2024-03-21 PROCEDURE — 83550 IRON BINDING TEST: CPT

## 2024-03-21 PROCEDURE — 86308 HETEROPHILE ANTIBODY SCREEN: CPT

## 2024-03-21 PROCEDURE — 6360000004 HC RX CONTRAST MEDICATION: Performed by: RADIOLOGY

## 2024-03-21 PROCEDURE — 86038 ANTINUCLEAR ANTIBODIES: CPT

## 2024-03-21 PROCEDURE — 2580000003 HC RX 258: Performed by: INTERNAL MEDICINE

## 2024-03-21 PROCEDURE — 71046 X-RAY EXAM CHEST 2 VIEWS: CPT

## 2024-03-21 PROCEDURE — 85025 COMPLETE CBC W/AUTO DIFF WBC: CPT

## 2024-03-21 PROCEDURE — 99223 1ST HOSP IP/OBS HIGH 75: CPT | Performed by: INTERNAL MEDICINE

## 2024-03-21 PROCEDURE — 86665 EPSTEIN-BARR CAPSID VCA: CPT

## 2024-03-21 PROCEDURE — 85610 PROTHROMBIN TIME: CPT

## 2024-03-21 PROCEDURE — 83540 ASSAY OF IRON: CPT

## 2024-03-21 PROCEDURE — 96374 THER/PROPH/DIAG INJ IV PUSH: CPT

## 2024-03-21 PROCEDURE — 6370000000 HC RX 637 (ALT 250 FOR IP): Performed by: INTERNAL MEDICINE

## 2024-03-21 RX ORDER — POTASSIUM CHLORIDE 7.45 MG/ML
10 INJECTION INTRAVENOUS PRN
Status: DISCONTINUED | OUTPATIENT
Start: 2024-03-21 | End: 2024-03-23 | Stop reason: HOSPADM

## 2024-03-21 RX ORDER — ACETAMINOPHEN 650 MG/1
650 SUPPOSITORY RECTAL EVERY 6 HOURS PRN
Status: DISCONTINUED | OUTPATIENT
Start: 2024-03-21 | End: 2024-03-23 | Stop reason: HOSPADM

## 2024-03-21 RX ORDER — ENOXAPARIN SODIUM 100 MG/ML
40 INJECTION SUBCUTANEOUS DAILY
Status: DISCONTINUED | OUTPATIENT
Start: 2024-03-21 | End: 2024-03-23 | Stop reason: HOSPADM

## 2024-03-21 RX ORDER — GUAIFENESIN/DEXTROMETHORPHAN 100-10MG/5
5 SYRUP ORAL EVERY 4 HOURS PRN
Status: DISCONTINUED | OUTPATIENT
Start: 2024-03-21 | End: 2024-03-23 | Stop reason: HOSPADM

## 2024-03-21 RX ORDER — BENZONATATE 100 MG/1
100 CAPSULE ORAL 3 TIMES DAILY PRN
Status: DISCONTINUED | OUTPATIENT
Start: 2024-03-21 | End: 2024-03-23 | Stop reason: HOSPADM

## 2024-03-21 RX ORDER — ALBUTEROL SULFATE 2.5 MG/3ML
2.5 SOLUTION RESPIRATORY (INHALATION) EVERY 4 HOURS PRN
Status: DISCONTINUED | OUTPATIENT
Start: 2024-03-21 | End: 2024-03-23 | Stop reason: HOSPADM

## 2024-03-21 RX ORDER — POTASSIUM CHLORIDE 20 MEQ/1
40 TABLET, EXTENDED RELEASE ORAL PRN
Status: DISCONTINUED | OUTPATIENT
Start: 2024-03-21 | End: 2024-03-23 | Stop reason: HOSPADM

## 2024-03-21 RX ORDER — ACETAMINOPHEN 325 MG/1
650 TABLET ORAL EVERY 6 HOURS PRN
Status: DISCONTINUED | OUTPATIENT
Start: 2024-03-21 | End: 2024-03-23 | Stop reason: HOSPADM

## 2024-03-21 RX ORDER — ONDANSETRON 2 MG/ML
4 INJECTION INTRAMUSCULAR; INTRAVENOUS EVERY 6 HOURS PRN
Status: DISCONTINUED | OUTPATIENT
Start: 2024-03-21 | End: 2024-03-23 | Stop reason: HOSPADM

## 2024-03-21 RX ORDER — MAGNESIUM SULFATE IN WATER 40 MG/ML
2000 INJECTION, SOLUTION INTRAVENOUS PRN
Status: DISCONTINUED | OUTPATIENT
Start: 2024-03-21 | End: 2024-03-23 | Stop reason: HOSPADM

## 2024-03-21 RX ORDER — SODIUM CHLORIDE 0.9 % (FLUSH) 0.9 %
5-40 SYRINGE (ML) INJECTION PRN
Status: DISCONTINUED | OUTPATIENT
Start: 2024-03-21 | End: 2024-03-23 | Stop reason: HOSPADM

## 2024-03-21 RX ORDER — KETOROLAC TROMETHAMINE 30 MG/ML
30 INJECTION, SOLUTION INTRAMUSCULAR; INTRAVENOUS ONCE
Status: DISCONTINUED | OUTPATIENT
Start: 2024-03-21 | End: 2024-03-23 | Stop reason: HOSPADM

## 2024-03-21 RX ORDER — 0.9 % SODIUM CHLORIDE 0.9 %
1000 INTRAVENOUS SOLUTION INTRAVENOUS ONCE
Status: COMPLETED | OUTPATIENT
Start: 2024-03-21 | End: 2024-03-21

## 2024-03-21 RX ORDER — SODIUM CHLORIDE 9 MG/ML
INJECTION, SOLUTION INTRAVENOUS CONTINUOUS
Status: DISCONTINUED | OUTPATIENT
Start: 2024-03-21 | End: 2024-03-23 | Stop reason: HOSPADM

## 2024-03-21 RX ORDER — ONDANSETRON 4 MG/1
4 TABLET, ORALLY DISINTEGRATING ORAL EVERY 8 HOURS PRN
Status: DISCONTINUED | OUTPATIENT
Start: 2024-03-21 | End: 2024-03-23 | Stop reason: HOSPADM

## 2024-03-21 RX ORDER — SODIUM CHLORIDE 9 MG/ML
INJECTION, SOLUTION INTRAVENOUS PRN
Status: DISCONTINUED | OUTPATIENT
Start: 2024-03-21 | End: 2024-03-23 | Stop reason: HOSPADM

## 2024-03-21 RX ORDER — SODIUM CHLORIDE 0.9 % (FLUSH) 0.9 %
5-40 SYRINGE (ML) INJECTION EVERY 12 HOURS SCHEDULED
Status: DISCONTINUED | OUTPATIENT
Start: 2024-03-21 | End: 2024-03-23 | Stop reason: HOSPADM

## 2024-03-21 RX ORDER — POLYETHYLENE GLYCOL 3350 17 G/17G
17 POWDER, FOR SOLUTION ORAL DAILY PRN
Status: DISCONTINUED | OUTPATIENT
Start: 2024-03-21 | End: 2024-03-23 | Stop reason: HOSPADM

## 2024-03-21 RX ADMIN — SODIUM CHLORIDE: 900 INJECTION, SOLUTION INTRAVENOUS at 15:45

## 2024-03-21 RX ADMIN — SODIUM CHLORIDE 1000 ML: 9 INJECTION, SOLUTION INTRAVENOUS at 11:51

## 2024-03-21 RX ADMIN — BENZONATATE 100 MG: 100 CAPSULE ORAL at 18:58

## 2024-03-21 RX ADMIN — GUAIFENESIN AND DEXTROMETHORPHAN 5 ML: 100; 10 SYRUP ORAL at 22:07

## 2024-03-21 RX ADMIN — IOPAMIDOL 70 ML: 755 INJECTION, SOLUTION INTRAVENOUS at 17:02

## 2024-03-21 ASSESSMENT — PAIN - FUNCTIONAL ASSESSMENT: PAIN_FUNCTIONAL_ASSESSMENT: NONE - DENIES PAIN

## 2024-03-21 NOTE — PROGRESS NOTES
4 Eyes Skin Assessment     NAME:  Jerzy Roblero  YOB: 1990  MEDICAL RECORD NUMBER:  [id number]    The patient is being assessed for  Admission    I agree that at least one RN has performed a thorough Head to Toe Skin Assessment on the patient. ALL assessment sites listed below have been assessed.      Areas assessed by both nurses:    Head, Face, Ears, Shoulders, Back, Chest, Arms, Elbows, Hands, Sacrum. Buttock, Coccyx, Ischium, Legs. Feet and Heels, and Under Medical Devices         Does the Patient have a Wound? No noted wound(s)       Erik Prevention initiated by RN: No  Wound Care Orders initiated by RN: No    Pressure Injury (Stage 3,4, Unstageable, DTI, NWPT, and Complex wounds) if present, place Wound referral order by RN under : No    New Ostomies, if present place, Ostomy referral order under : No     Nurse 1 eSignature: Electronically signed by Brittany Montemayor RN on 3/21/24 at 6:04 PM EDT    **SHARE this note so that the co-signing nurse can place an eSignature**    Nurse 2 eSignature: Electronically signed by Rafy Kim RN on 3/21/24 at 7:30 PM EDT

## 2024-03-21 NOTE — TELEPHONE ENCOUNTER
Location of patient: [place]    Subjective: Caller states \"I'm not feeling better and my provider told me to go to the ER. + Flu B, patient has already registered/checked in  at the ER.\"     No triage; Duplicate contact.    Writer provided patient with following: \"I can take your information and make a note that you did call. However, I cannot complete a duplicate triage while you are being treated in the ER. Our services are for before you seek treatment at the ER. The determination of your copay will be based solely on wether your insurance company identifies a true medical emergency when reviewing your claim.\"    Attention Provider:  Thank you for allowing me to participate in the care of your patient.  The patient was connected to triage in response to symptoms provided.   Please do not respond through this encounter as the response is not directed to a shared pool.    Reason for Disposition   Caller has already spoken with the PCP and has no further questions.    Protocols used: No Contact or Duplicate Contact Call-ADULT-

## 2024-03-21 NOTE — CARE COORDINATION
Contacted patient to review CT scan results.  Patient does have evidence of lymphadenopathy in multiple sites as well as moderate splenomegaly.     I discussed that I would recommend a hematology/oncology consult to review this as well as his peripheral smear and he is agreeable.    Consult placed to Dr. Grier of hem/onc.

## 2024-03-21 NOTE — H&P
[facility]   History and Physical      CHIEF COMPLAINT: Cough and fever x 2 weeks    History of Present Illness:  33 y.o. male with a history of exercise-induced asthma  who presents with above complaints.  Patient states his symptoms started back on Friday March 8 when he had fever and neck pain.  Over the weekend he developed aches and chills.  Since then he has had fevers on and off and as high as 102.  He saw his PCP March 11 and was tested for COVID and flu and those were negative.  He felt better for short time try to get back into normal activity but then symptoms started to come back on the 13th.  He presented to the ER March 16 where he was tested for flu again and came back positive for influenza B he was felt to be outside the window for treatment with Tamiflu at that time.  Patient states has been taking approximate 1500 mg of Tylenol alternating with ibuprofen over the last several days.  ER provider ordered acute hepatitis panel and ordered a gallbladder ultrasound the latter which came back unremarkable.  I requested GI consult; patient has been established with Dr. Bush's group.    Informant(s) for H&P:patient, wife and EMR    REVIEW OF SYSTEMS:  As per HPI, otherwise negative.      PMH:  Past Medical History:   Diagnosis Date    Allergic rhinitis     Asthma, exercise induced 2000    COVID 01/2022    High risk heterosexual behavior 4/20/2022    Nonintractable headache 3/11/2024    Osteoarthritis     Sacrococcygeal pilonidal cyst 8/19/2019    Tick bite 5/9/2022       Surgical History:  Past Surgical History:   Procedure Laterality Date    FRACTURE SURGERY Right     hand    PILONIDAL CYST EXCISION N/A 10/4/2019    PILONIDAL CYSTECTOMY performed by Aldo Luna MD at [facility] OR    TONSILLECTOMY      TONSILLECTOMY AND ADENOIDECTOMY      WISDOM TOOTH EXTRACTION         Medications Prior to Admission:    Prior to Admission medications    Medication Sig Start  likely viral etiology but not necessarily influenza B.  ER did check a rapid strep a test which was negative.  Mononucleosis test was negative but GI ordered Subhash-Barr virus titers.   -Given persistent fevers and lymphadenopathy, will check CT of the soft tissue neck as well as CT chest to look for any pneumonic infiltrates or mediastinal lymphadenopathy.  Chest x-ray however did come back unremarkable with no acute process.    # Leukocytosis  -WBC count elevated 11.8 but there was a predominance of lymphocytes at 49%.  There was some immature white blood cells i.e. metamyelocytes and myelocytes.  This all could be reactive to acute viral infection but will have peripheral smear evaluated and repeat CBC with differential in the a.m.    # Tachycardia  -likely from fever and component of dehydration. He receive 1L NS bolus in ER. Will continue with NS at 125 ml/hr.    Code Status: FULL  DVT prophylaxis: Lovenox    Case discussed with wife over the phone (listening on speaker phone)    Case discussed with Dr. De Guzman of GI over the phone.    NOTE: This report was transcribed using voice recognition software. Every effort was made to ensure accuracy; however, inadvertent computerized transcription errors may be present.     Electronically signed by Jerome Stallworth MD on 3/21/2024 at 3:51 PM

## 2024-03-21 NOTE — ED NOTES
Pt report called to 4th floor, pt stable for transport whenever bed is ready upstairs. Pt able to go to CT at 1700. Will take pt to CT prior to 4th floor if possible.

## 2024-03-21 NOTE — ACP (ADVANCE CARE PLANNING)
Advance Care Planning   Healthcare Decision Maker:    Primary Decision Maker: Alison Roblero - [facility] - [phone]    Click here to complete Healthcare Decision Makers including selection of the Healthcare Decision Maker Relationship (ie \"Primary\").  Today we documented Decision Maker(s) consistent with Legal Next of Kin hierarchy.   Electronically signed by MARILEE Pickett on 3/21/2024 at 3:00 PM

## 2024-03-21 NOTE — CARE COORDINATION
Case Management Assessment  Initial Evaluation    Date/Time of Evaluation: 3/21/2024 2:58 PM  Assessment Completed by: MARILEE Pickett    If patient is discharged prior to next notation, then this note serves as note for discharge by case management.    Patient Name: Jerzy Roblero                   YOB: 1990  Diagnosis: No admission diagnoses are documented for this encounter.                   Date / Time: 3/21/2024 10:44 AM    Patient Admission Status: Emergency   Readmission Risk (Low < 19, Mod (19-27), High > 27): No data recorded  Current PCP: Madison Maurice APRN - CNP  PCP verified by CM? Yes    Chart Reviewed: Yes      History Provided by: Patient, Medical Record  Patient Orientation: Alert and Oriented, Person, Place, Situation, Self    Patient Cognition: Alert    Hospitalization in the last 30 days (Readmission):  No    If yes, Readmission Assessment in  Navigator will be completed.    Advance Directives:      Code Status: Prior   Patient's Primary Decision Maker is: Legal Next of Kin      Discharge Planning:    Patient lives with:   Type of Home:    Primary Care Giver: Self  Patient Support Systems include: Spouse/Significant Other, Parent   Current Financial resources:    Current community resources:    Current services prior to admission:              Current DME:              Type of Home Care services:       ADLS  Prior functional level: Independent in ADLs/IADLs  Current functional level: Independent in ADLs/IADLs    PT AM-PAC:   /24  OT AM-PAC:   /24    Family can provide assistance at DC: Yes  Would you like Case Management to discuss the discharge plan with any other family members/significant others, and if so, who? Yes (Spouse- Alison)  Plans to Return to Present Housing: Yes  Other Identified Issues/Barriers to RETURNING to current housing: none  Potential Assistance needed at discharge:              Potential DME:    Patient expects to discharge to:    Plan for

## 2024-03-21 NOTE — ED PROVIDER NOTES
Determinants include   Social Connections: Not on file    Social Determinants : None.   Chronic conditions    Past Medical History:   Diagnosis Date    Allergic rhinitis     Asthma, exercise induced 2000    COVID 01/2022    High risk heterosexual behavior 4/20/2022    Nonintractable headache 3/11/2024    Osteoarthritis     Sacrococcygeal pilonidal cyst 8/19/2019    Tick bite 5/9/2022   .    Physical exam   Constitutional/General: Alert and oriented x3, well appearing, non toxic in NAD  Head: Normocephalic and atraumatic  Eyes: PERRL, EOMI  Mouth: Oropharynx clear, handling secretions, no trismus mild erythema of the pharynx no tonsillar swelling or exudate uvula midline.  Right-sided cervical adenopathy present.  Neck: Supple, full ROM,   Pulmonary: Lungs clear to auscultation bilaterally, no wheezes, rales, or rhonchi. Not in respiratory distress  Cardiovascular:  Regular rate and rhythm, no murmurs, gallops, or rubs. 2+ distal pulses  Abdomen: Soft, non tender, non distended,   Extremities: Moves all extremities x 4. Warm and well perfused  Skin: warm and dry without rash  Neurologic: GCS 15,  Psych: Normal Affect.  Vital signs /82Temp   98.8 °F    (37.1 °C)  Pulse 114Respirations 18SpO2 100%Weight - Scale   76.2 kg    (168 lb)  Ht   177.8 cm    (5' 10\")  BMI 24.11 kg/m²Pain Level -- .  Differential diagnoses include but not limited to strep, viral URI, pneumonia, mono, dehydration. Diagnostic studies revealed CBC WBC 11.8 normal hemoglobin CMP no electrolyte imbalance.  Alk phos 495   mono negative strep negative urinalysis no bili in the urine no UTI.  Ultrasound gallbladder no acute findings consults included gastroenterology. Results were discussed with patient.  Patient was given 1 L normal saline, Toradol 30 mg IV x 1 for their symptoms with mild improvement. Patient will be be admitted for transaminitis for further evaluation.      Discharge Instructions:   Patient referred to  No  condition is good      NOTE: This report was transcribed using voice recognition software. Every effort was made to ensure accuracy; however, inadvertent computerized transcription errors may be present

## 2024-03-22 LAB
ALBUMIN SERPL-MCNC: 3.6 G/DL (ref 3.5–5.2)
ALP SERPL-CCNC: 425 U/L (ref 40–129)
ALT SERPL-CCNC: 544 U/L (ref 0–40)
ANA SER QL IA: NEGATIVE
ANION GAP SERPL CALCULATED.3IONS-SCNC: 13 MMOL/L (ref 7–16)
AST SERPL-CCNC: 206 U/L (ref 0–39)
BASOPHILS # BLD: 0 K/UL (ref 0–0.2)
BASOPHILS NFR BLD: 0 % (ref 0–2)
BILIRUB DIRECT SERPL-MCNC: 0.4 MG/DL (ref 0–0.3)
BILIRUB INDIRECT SERPL-MCNC: 0.3 MG/DL (ref 0–1)
BILIRUB SERPL-MCNC: 0.7 MG/DL (ref 0–1.2)
BUN SERPL-MCNC: 6 MG/DL (ref 6–20)
CALCIUM SERPL-MCNC: 9 MG/DL (ref 8.6–10.2)
CHLORIDE SERPL-SCNC: 103 MMOL/L (ref 98–107)
CO2 SERPL-SCNC: 24 MMOL/L (ref 22–29)
CREAT SERPL-MCNC: 0.9 MG/DL (ref 0.7–1.2)
EOSINOPHIL # BLD: 0.11 K/UL (ref 0.05–0.5)
EOSINOPHILS RELATIVE PERCENT: 1 % (ref 0–6)
ERYTHROCYTE [DISTWIDTH] IN BLOOD BY AUTOMATED COUNT: 12.6 % (ref 11.5–15)
GFR SERPL CREATININE-BSD FRML MDRD: >60 ML/MIN/1.73M2
GLUCOSE SERPL-MCNC: 98 MG/DL (ref 74–99)
HAV IGM SERPL QL IA: NONREACTIVE
HAV IGM SERPL QL IA: NONREACTIVE
HBV CORE IGM SERPL QL IA: NONREACTIVE
HBV CORE IGM SERPL QL IA: NONREACTIVE
HBV SURFACE AG SERPL QL IA: NONREACTIVE
HBV SURFACE AG SERPL QL IA: NONREACTIVE
HCT VFR BLD AUTO: 39.7 % (ref 37–54)
HCV AB SERPL QL IA: NONREACTIVE
HCV AB SERPL QL IA: NONREACTIVE
HGB BLD-MCNC: 13.3 G/DL (ref 12.5–16.5)
INR PPP: 1.2
LYMPHOCYTES NFR BLD: 6.74 K/UL (ref 1.5–4)
LYMPHOCYTES RELATIVE PERCENT: 55 % (ref 20–42)
MCH RBC QN AUTO: 27 PG (ref 26–35)
MCHC RBC AUTO-ENTMCNC: 33.5 G/DL (ref 32–34.5)
MCV RBC AUTO: 80.7 FL (ref 80–99.9)
METAMYELOCYTES ABSOLUTE COUNT: 0.21 K/UL (ref 0–0.12)
METAMYELOCYTES: 2 % (ref 0–1)
MICROORGANISM SPEC CULT: NO GROWTH
MONOCYTES NFR BLD: 0.32 K/UL (ref 0.1–0.95)
MONOCYTES NFR BLD: 3 % (ref 2–12)
MYELOCYTES ABSOLUTE COUNT: 0.21 K/UL
MYELOCYTES: 2 %
NEUTROPHILS NFR BLD: 38 % (ref 43–80)
NEUTS SEG NFR BLD: 4.6 K/UL (ref 1.8–7.3)
PATH REV BLD -IMP: NORMAL
PLATELET # BLD AUTO: 194 K/UL (ref 130–450)
PMV BLD AUTO: 10.2 FL (ref 7–12)
POTASSIUM SERPL-SCNC: 4 MMOL/L (ref 3.5–5)
PROT SERPL-MCNC: 6.6 G/DL (ref 6.4–8.3)
PROTHROMBIN TIME: 13.3 SEC (ref 9.3–12.4)
RBC # BLD AUTO: 4.92 M/UL (ref 3.8–5.8)
RBC # BLD: ABNORMAL 10*6/UL
SODIUM SERPL-SCNC: 140 MMOL/L (ref 132–146)
SPECIMEN DESCRIPTION: NORMAL
WBC OTHER # BLD: 12.2 K/UL (ref 4.5–11.5)

## 2024-03-22 PROCEDURE — 36415 COLL VENOUS BLD VENIPUNCTURE: CPT

## 2024-03-22 PROCEDURE — 96361 HYDRATE IV INFUSION ADD-ON: CPT

## 2024-03-22 PROCEDURE — 99233 SBSQ HOSP IP/OBS HIGH 50: CPT | Performed by: INTERNAL MEDICINE

## 2024-03-22 PROCEDURE — 85025 COMPLETE CBC W/AUTO DIFF WBC: CPT

## 2024-03-22 PROCEDURE — 80053 COMPREHEN METABOLIC PANEL: CPT

## 2024-03-22 PROCEDURE — 6370000000 HC RX 637 (ALT 250 FOR IP): Performed by: INTERNAL MEDICINE

## 2024-03-22 PROCEDURE — G0378 HOSPITAL OBSERVATION PER HR: HCPCS

## 2024-03-22 PROCEDURE — 81256 HFE GENE: CPT

## 2024-03-22 PROCEDURE — 85610 PROTHROMBIN TIME: CPT

## 2024-03-22 PROCEDURE — 82248 BILIRUBIN DIRECT: CPT

## 2024-03-22 PROCEDURE — 2580000003 HC RX 258: Performed by: INTERNAL MEDICINE

## 2024-03-22 RX ORDER — BUDESONIDE AND FORMOTEROL FUMARATE DIHYDRATE 160; 4.5 UG/1; UG/1
2 AEROSOL RESPIRATORY (INHALATION)
Status: DISCONTINUED | OUTPATIENT
Start: 2024-03-22 | End: 2024-03-23 | Stop reason: HOSPADM

## 2024-03-22 RX ADMIN — BUDESONIDE AND FORMOTEROL FUMARATE DIHYDRATE 2 PUFF: 160; 4.5 AEROSOL RESPIRATORY (INHALATION) at 15:47

## 2024-03-22 RX ADMIN — GUAIFENESIN AND DEXTROMETHORPHAN 5 ML: 100; 10 SYRUP ORAL at 21:48

## 2024-03-22 RX ADMIN — BENZONATATE 100 MG: 100 CAPSULE ORAL at 15:48

## 2024-03-22 RX ADMIN — SODIUM CHLORIDE: 900 INJECTION, SOLUTION INTRAVENOUS at 07:04

## 2024-03-22 RX ADMIN — SODIUM CHLORIDE: 900 INJECTION, SOLUTION INTRAVENOUS at 15:42

## 2024-03-22 NOTE — PLAN OF CARE
Problem: Safety - Adult  Goal: Free from fall injury  3/22/2024 1555 by Rafy Kim, RN  Outcome: Progressing     Problem: Pain  Goal: Verbalizes/displays adequate comfort level or baseline comfort level  3/22/2024 1555 by Rafy Kim, RN  Outcome: Progressing

## 2024-03-22 NOTE — CONSULTS
[facility]  Hematology/Oncology  Consult  Dr. Rambo Grier      Patient Name: Jerzy Roblero  YOB: 1990  PCP: Madison Maurice, APRN - CNP   Referring Provider:      Reason for Consultation:   Chief Complaint   Patient presents with    Influenza     For past 2 weeks    Cough    OTHER     Poor appetite for past week        History of Present Illness:  This pt is a pleasant 32 yo male who presented to the ER with cough and fever over 2 weeks. Reportedly had negative covid and flu testing with PCP. He ended up presenting to ER for persistent symptoms and was diagnosed with flu B. CMP with normal renal function. LFTs were elevated but have improved today. T bili has remain normal. CT N/C/A/P as below, with mild splenomegaly and mild axillary, mediastinal and cervical LAD. CBC today with WBC 12.2, H+H and platelets WNL. ANC WNL. ALC elevated at 6.7. Myelocyte absolute was 0.21. Oncology consulted due to the metamyelocytes and myelocytes on differential and LAD above the diaphragm     Diagnostic Data:     Past Medical History:   Diagnosis Date    Allergic rhinitis     Asthma, exercise induced 2000    COVID 01/2022    High risk heterosexual behavior 4/20/2022    Nonintractable headache 3/11/2024    Osteoarthritis     Sacrococcygeal pilonidal cyst 8/19/2019    Tick bite 5/9/2022       Patient Active Problem List    Diagnosis Date Noted    Influenza B 03/21/2024    Elevated LFTs 03/21/2024    LAD (lymphadenopathy) of right cervical region 03/21/2024    Alkaline phosphatase elevation 03/21/2024    Transaminitis 03/21/2024    Tachycardia 03/21/2024    Fever 03/11/2024    Nonintractable headache 03/11/2024    Viral URI 03/11/2024    Food allergy 12/18/2023    Black tarry stools 03/23/2023    Gastroesophageal reflux disease with esophagitis without hemorrhage 03/23/2023    Chronic nasal congestion 08/19/2019    Globus sensation 08/19/2019    FH: heart disease 08/19/2019        Past Surgical

## 2024-03-22 NOTE — CARE COORDINATION
3/22/2024 0935 CM note: FLU B + 3/16/24, on room air. Pt is independent and resides with his wife and 8 week old baby. Plan is for pt to return home at d/c. Await hematology/oncology plan of care. Sherri BERNAL

## 2024-03-22 NOTE — CONSULTS
CONSULT  Vivek De Guzman M.D.  [facility]  Dr. Sheri Bush M.D.,  Dr. Karson Lemons M.D.,  Dr. Rambo Rangel D.O.,  Dr. Desmond Carlson D.O. ,  Dr. Jett Egan M.D.,      Jerzy Ward Annika  33 y.o.  male      Re: Transaminitis  Requesting physician: SHERICE Mckinney/ED  Admitting physician: Dr. Stallworth  Date:9:49 AM 3/22/2024      HPI: 33-year old male patient seen in the hospital for above described issue.  Patient is a registered nurse.  He presents to the hospital because of not feeling well.  He was noted to have abnormal liver profile with elevated transaminases and elevated alkaline phosphatase but nonelevated bilirubin.  Patient recently was diagnosed with influenza B diagnosed by PCR.  Patient reports that he has been taking Tylenol but not to exceed 1.5 -2 g/day.  Patient denies any other prescription medication.  He reports that he takes some type of desiccated beef liver supplement which he purchases online.  Patient denies any significant alcohol abuse.  Viral hepatitis serology previously has reported negative hepatitis C antibody and positive hepatitis B surface antibody with negative surface antigen consistent with history of hepatitis B vaccination.  .  Imaging has been obtained with CT scan of the abdomen and pelvis with IV contrast reported to show mild mediastinal lymphadenopathy.  Liver is reported to be normal in appearance without mass or intrahepatic biliary ductal dilatation.  Reported this mild splenomegaly at 15.2 cm but also without mass.  Reported this mild mesenteric adenitis.    Information sources:   -Patient  -Wife (over phone)  -medical record  -health care team    PMHx:  Past Medical History:   Diagnosis Date    Allergic rhinitis     Asthma, exercise induced 2000    COVID 01/2022    High risk heterosexual behavior 4/20/2022    Nonintractable headache 3/11/2024    Osteoarthritis     Sacrococcygeal pilonidal cyst 8/19/2019    Tick bite 5/9/2022

## 2024-03-22 NOTE — PROGRESS NOTES
[facility] Hospitalist   Progress Note    Admitting Date and Time: 3/21/2024 10:44 AM  Admit Dx: Influenza B [J10.1]  Transaminitis [R74.01]    Subjective:    Pt feels better but didn't sleep much last night. Also, not much of an appetite. Dr. Grier at bedside.    Per RN: Patient with low-grade temperature this morning 100.2       ketorolac  30 mg IntraVENous Once    sodium chloride flush  5-40 mL IntraVENous 2 times per day    enoxaparin  40 mg SubCUTAneous Daily     benzonatate, 100 mg, TID PRN  guaiFENesin-dextromethorphan, 5 mL, Q4H PRN  sodium chloride flush, 5-40 mL, PRN  sodium chloride, , PRN  potassium chloride, 40 mEq, PRN   Or  potassium alternative oral replacement, 40 mEq, PRN   Or  potassium chloride, 10 mEq, PRN  magnesium sulfate, 2,000 mg, PRN  ondansetron, 4 mg, Q8H PRN   Or  ondansetron, 4 mg, Q6H PRN  polyethylene glycol, 17 g, Daily PRN  acetaminophen, 650 mg, Q6H PRN   Or  acetaminophen, 650 mg, Q6H PRN  albuterol, 2.5 mg, Q4H PRN         Objective:    BP (!) 145/65   Pulse 95   Temp 98.9 °F (37.2 °C) (Oral)   Resp 18   Ht 1.778 m (5' 10\")   Wt 76.2 kg (168 lb)   SpO2 100%   BMI 24.11 kg/m²   General Appearance: alert and oriented to person, place and time and in no acute distress; patient with intermittent dry cough  Skin: warm and dry  Head: normocephalic and atraumatic  Eyes: pupils equal, round, and reactive to light, extraocular eye movements intact, conjunctivae normal  Neck: Tender right cervical lymphadenopathy.  No appreciable lymphadenopathy in the left nor the axilla or groin.  Pulmonary/Chest: clear to auscultation bilaterally- no wheezes, rales or rhonchi, normal air movement, no respiratory distress  Cardiovascular: normal rate, normal S1 and S2 and no carotid bruits  Abdomen: soft, thin, mild RUQ tenderness, non-distended, normal bowel sounds, mild splenomegaly?  Extremities: no cyanosis, no clubbing and no edema  Neurologic: no cranial nerve deficit and  54 U/L          Rapid Strep Screen [[id number]] Collected: 03/21/24 1141     Specimen: Throat Updated: 03/21/24 1225      Source .THROAT SWAB      Strep A, Molecular NEGATIVE     Mononucleosis Screen [[id number]] Collected: 03/21/24 1141     Specimen: Blood Updated: 03/21/24 1225      Mononucleosis Screen NEGATIVE       Radiology:   CT SOFT TISSUE NECK W CONTRAST   Final Result   1. Moderate right level II superior jugular chain lymphadenopathy, measuring   1.8 cm in short axis diameter.   2. Mild left level II superior jugular chain lymphadenopathy with lymph nodes   with short axis diameter of 1.1 cm.   3. Mild right superior posterior triangle lymphadenopathy, level VA, short   axis diameter 1.1 cm.   4. No mass or abscess is seen.         CT CHEST W CONTRAST   Final Result   1. Mild mediastinal, bilateral axillary, and right mid jugular chain cervical   lymphadenopathy.   2. Moderate splenomegaly.   3. Mild mesenteric adenitis.   4. No acute cardiopulmonary process.   5. No acute intra-abdominal or pelvic process.         CT ABDOMEN PELVIS W IV CONTRAST Additional Contrast? Oral   Final Result   1. Mild mediastinal, bilateral axillary, and right mid jugular chain cervical   lymphadenopathy.   2. Moderate splenomegaly.   3. Mild mesenteric adenitis.   4. No acute cardiopulmonary process.   5. No acute intra-abdominal or pelvic process.         US GALLBLADDER RUQ   Final Result   Unremarkable right upper quadrant ultrasound.         XR CHEST (2 VW)   Final Result   No acute process.             Assessment:  Principal Problem:    Influenza B  Active Problems:    Elevated LFTs    LAD (lymphadenopathy) of right cervical region    Alkaline phosphatase elevation    Transaminitis    Tachycardia  Resolved Problems:    * No resolved hospital problems. *      Plan:    # Elevated LFTs/alkaline phosphatase with recent influenza B infection  -Elevated alk phos 495, ,  on labs which most likely is related to

## 2024-03-23 VITALS
OXYGEN SATURATION: 96 % | DIASTOLIC BLOOD PRESSURE: 68 MMHG | HEIGHT: 70 IN | HEART RATE: 72 BPM | WEIGHT: 168 LBS | RESPIRATION RATE: 20 BRPM | SYSTOLIC BLOOD PRESSURE: 158 MMHG | TEMPERATURE: 98.8 F | BODY MASS INDEX: 24.05 KG/M2

## 2024-03-23 LAB
ALBUMIN SERPL-MCNC: 3.7 G/DL (ref 3.5–5.2)
ALP SERPL-CCNC: 442 U/L (ref 40–129)
ALT SERPL-CCNC: 654 U/L (ref 0–40)
ANION GAP SERPL CALCULATED.3IONS-SCNC: 14 MMOL/L (ref 7–16)
AST SERPL-CCNC: 313 U/L (ref 0–39)
BASOPHILS # BLD: 0 K/UL (ref 0–0.2)
BASOPHILS NFR BLD: 0 % (ref 0–2)
BILIRUB SERPL-MCNC: 0.7 MG/DL (ref 0–1.2)
BUN SERPL-MCNC: 5 MG/DL (ref 6–20)
CALCIUM SERPL-MCNC: 8.8 MG/DL (ref 8.6–10.2)
CHLORIDE SERPL-SCNC: 104 MMOL/L (ref 98–107)
CO2 SERPL-SCNC: 24 MMOL/L (ref 22–29)
CREAT SERPL-MCNC: 0.8 MG/DL (ref 0.7–1.2)
CRP SERPL HS-MCNC: 7 MG/L (ref 0–5)
EOSINOPHIL # BLD: 0.27 K/UL (ref 0.05–0.5)
EOSINOPHILS RELATIVE PERCENT: 2 % (ref 0–6)
ERYTHROCYTE [DISTWIDTH] IN BLOOD BY AUTOMATED COUNT: 13 % (ref 11.5–15)
ERYTHROCYTE [SEDIMENTATION RATE] IN BLOOD BY WESTERGREN METHOD: 8 MM/HR (ref 0–15)
GFR SERPL CREATININE-BSD FRML MDRD: >60 ML/MIN/1.73M2
GLUCOSE SERPL-MCNC: 95 MG/DL (ref 74–99)
HCT VFR BLD AUTO: 38.6 % (ref 37–54)
HGB BLD-MCNC: 12.9 G/DL (ref 12.5–16.5)
LYMPHOCYTES NFR BLD: 10.24 K/UL (ref 1.5–4)
LYMPHOCYTES RELATIVE PERCENT: 77 % (ref 20–42)
MCH RBC QN AUTO: 27.1 PG (ref 26–35)
MCHC RBC AUTO-ENTMCNC: 33.4 G/DL (ref 32–34.5)
MCV RBC AUTO: 81.1 FL (ref 80–99.9)
MONOCYTES NFR BLD: 0.27 K/UL (ref 0.1–0.95)
MONOCYTES NFR BLD: 2 % (ref 2–12)
MYELOCYTES ABSOLUTE COUNT: 0.13 K/UL
MYELOCYTES: 1 %
NEUTROPHILS NFR BLD: 18 % (ref 43–80)
NEUTS SEG NFR BLD: 2.39 K/UL (ref 1.8–7.3)
PLATELET # BLD AUTO: 189 K/UL (ref 130–450)
PMV BLD AUTO: 10.1 FL (ref 7–12)
POTASSIUM SERPL-SCNC: 4.1 MMOL/L (ref 3.5–5)
PROT SERPL-MCNC: 6.5 G/DL (ref 6.4–8.3)
RBC # BLD AUTO: 4.76 M/UL (ref 3.8–5.8)
SEND OUT REPORT: NORMAL
SODIUM SERPL-SCNC: 142 MMOL/L (ref 132–146)
TEST NAME: NORMAL
WBC OTHER # BLD: 13.3 K/UL (ref 4.5–11.5)

## 2024-03-23 PROCEDURE — 86140 C-REACTIVE PROTEIN: CPT

## 2024-03-23 PROCEDURE — 85652 RBC SED RATE AUTOMATED: CPT

## 2024-03-23 PROCEDURE — 94640 AIRWAY INHALATION TREATMENT: CPT

## 2024-03-23 PROCEDURE — G0378 HOSPITAL OBSERVATION PER HR: HCPCS

## 2024-03-23 PROCEDURE — 96361 HYDRATE IV INFUSION ADD-ON: CPT

## 2024-03-23 PROCEDURE — 36415 COLL VENOUS BLD VENIPUNCTURE: CPT

## 2024-03-23 PROCEDURE — 80053 COMPREHEN METABOLIC PANEL: CPT

## 2024-03-23 PROCEDURE — 83516 IMMUNOASSAY NONANTIBODY: CPT

## 2024-03-23 PROCEDURE — 85025 COMPLETE CBC W/AUTO DIFF WBC: CPT

## 2024-03-23 PROCEDURE — 86255 FLUORESCENT ANTIBODY SCREEN: CPT

## 2024-03-23 RX ORDER — BUDESONIDE AND FORMOTEROL FUMARATE DIHYDRATE 160; 4.5 UG/1; UG/1
2 AEROSOL RESPIRATORY (INHALATION)
Qty: 10.2 G | Refills: 0
Start: 2024-03-23

## 2024-03-23 RX ORDER — BENZONATATE 100 MG/1
100 CAPSULE ORAL 3 TIMES DAILY PRN
Qty: 30 CAPSULE | Refills: 0 | Status: SHIPPED | OUTPATIENT
Start: 2024-03-23 | End: 2024-04-02

## 2024-03-23 RX ORDER — ALBUTEROL SULFATE 90 UG/1
2 AEROSOL, METERED RESPIRATORY (INHALATION) 4 TIMES DAILY PRN
Qty: 18 G | Refills: 0 | Status: SHIPPED | OUTPATIENT
Start: 2024-03-23

## 2024-03-23 RX ADMIN — BUDESONIDE AND FORMOTEROL FUMARATE DIHYDRATE 2 PUFF: 160; 4.5 AEROSOL RESPIRATORY (INHALATION) at 09:07

## 2024-03-23 NOTE — DISCHARGE INSTRUCTIONS
Your information:  Name: Jerzy Roblero  : 1990    Your instructions:    YOU ARE BEING DISCHARGED HOME. PLEASE MAKE AND KEEP YOUR FOLLOW UP APPOINTMENTS.    What to do after you leave the hospital:    Recommended diet: regular diet    Recommended activity: activity as tolerated    IF YOU EXPERIENCE ANY OF THE FOLLOWING SYMPTOMS, CHEST PAIN, SHORTNESS OF BREATH, COUGHING UP BLOOD OR BLOODY SPUTUM, STOMACH PAIN OR CRAMPING, DARK, TARRY STOOLS, LOSS OF APPETITE, GENERAL NOT FEELING WELL, SIGNS AND SYMPTOMS OF INFECTION LIKE FEVER AND OR CHILLS, PLEASE CALL  Madison Maurice APRN - CNP  OR RETURN TO THE EMERGENCY ROOM.    The following personal items were collected during your admission and were returned to you:    Belongings  Dental Appliances: None  Vision - Corrective Lenses: Eyeglasses  Hearing Aid: None  Clothing: Socks, Shirt, Pants, Footwear, Jacket/Coat  Jewelry: None  Electronic Devices: Cell Phone,   Weapons (Notify Protective Services/Security): None  Other Valuables: Wallet, Keys, At bedside ($30)  Home Medications: None  Valuables Given To: Patient  Provide Name(s) of Who Valuable(s) Were Given To: N/A    Information obtained by:  By signing below, I understand that if any problems occur once I leave the hospital I am to contact  Madison Maurice APRN - CNP or go to emergency room.  I understand and acknowledge receipt of the instructions indicated above.

## 2024-03-23 NOTE — PROGRESS NOTES
PROGRESS NOTE    [facility]  Dr. Sheri Bush M.D., Dr. Karson Lemons M.D., Rambo Rangel D.O.,  Jett Egan M.D., Desmond Carlson D.O., and Vivek De Guzman M.D.      Jerzy Roblero  33 y.o.  male    SUBJECTIVE: Pt still with cough this am.  Has appetite and having BM with no blood or melena.  No significant abd pain.  No icterus/jaundice. Pt still with significant cough.    OBJECTIVE:    BP (!) 145/91   Pulse (!) 104   Temp 98.3 °F (36.8 °C) (Oral)   Resp 20   Ht 1.778 m (5' 10\")   Wt 76.2 kg (168 lb)   SpO2 100%   BMI 24.11 kg/m²     HEENT:PEERL, no icterus  Heart: RRR  Lungs: coarse B/L  Abd.: soft, NT, ND, BS +, no G/R, no HSM  Extr.: no C/C/E, no bruising       Lab Results   Component Value Date/Time    WBC 13.3 03/23/2024 06:51 AM    HGB 12.9 03/23/2024 06:51 AM    HCT 38.6 03/23/2024 06:51 AM    MCV 81.1 03/23/2024 06:51 AM    RDW 13.0 03/23/2024 06:51 AM     03/23/2024 06:51 AM     Lab Results   Component Value Date/Time     03/23/2024 06:51 AM    K 4.1 03/23/2024 06:51 AM     03/23/2024 06:51 AM    CO2 24 03/23/2024 06:51 AM    BUN 5 03/23/2024 06:51 AM    CREATININE 0.8 03/23/2024 06:51 AM    CALCIUM 8.8 03/23/2024 06:51 AM    PROT 6.5 03/23/2024 06:51 AM    LABALBU 3.7 03/23/2024 06:51 AM    BILITOT 0.7 03/23/2024 06:51 AM    ALKPHOS 442 03/23/2024 06:51 AM     03/23/2024 06:51 AM     03/23/2024 06:51 AM     Lab Results   Component Value Date/Time    LIPASE 32 11/21/2023 03:27 PM     No results found for: \"AMYLASE\"    ASSESSMENT/PLAN:  Patient Active Problem List   Diagnosis    Chronic nasal congestion    Globus sensation    FH: heart disease    Black tarry stools    Gastroesophageal reflux disease with esophagitis without hemorrhage    Food allergy    Fever    Nonintractable headache    Viral URI    Influenza B    Elevated LFTs    LAD (lymphadenopathy) of right cervical region    Alkaline phosphatase elevation    Transaminitis    Tachycardia

## 2024-03-23 NOTE — PLAN OF CARE
Problem: Safety - Adult  Goal: Free from fall injury  3/23/2024 0030 by Jovita Kumar RN  Outcome: Progressing  3/22/2024 1555 by Rafy Kim RN  Outcome: Progressing     Problem: Pain  Goal: Verbalizes/displays adequate comfort level or baseline comfort level  3/23/2024 0030 by Jovita Kumar RN  Outcome: Progressing  3/22/2024 1555 by Rafy Kim RN  Outcome: Progressing

## 2024-03-23 NOTE — DISCHARGE SUMMARY
[facility] Hospitalist       Hospitalist Physician Discharge Summary       Madison Maurice, APRN - CNP  [address]  [address]  [place] [address]  [phone]    Schedule an appointment as soon as possible for a visit in 1 week(s)  Hospital follow up    Rambo Grier MD  [address]  [place] [address]  [phone]    Schedule an appointment as soon as possible for a visit in 4 week(s)  Hospital follow up for lymphadenopathy    Karson Lemons MD  [address] [address]  [phone]    Schedule an appointment as soon as possible for a visit on 4/3/2024  Hospital follow up for elevated liver function tests. Previously scheduled appt.      Activity level: ***    Diet: ADULT DIET; Regular    Labs:***    Condition at discharge: ***    Dispo:***    Continue supplemental oxygen via nasal canula @ 2 LPM round-the-clock.    Continue CPAP / BiPAP during sleep as prior to admission.    Patient ID:  Jerzy Roblero  [id number]  33 y.o.  1990    Admit date: 3/21/2024    Discharge date and time:  3/23/2024  2:30 PM    Admission Diagnoses: Principal Problem:    Influenza B  Active Problems:    Elevated LFTs    LAD (lymphadenopathy) of right cervical region    Alkaline phosphatase elevation    Transaminitis    Tachycardia  Resolved Problems:    * No resolved hospital problems. *      Discharge Diagnoses: Principal Problem:    Influenza B  Active Problems:    Elevated LFTs    LAD (lymphadenopathy) of right cervical region    Alkaline phosphatase elevation    Transaminitis    Tachycardia  Resolved Problems:    * No resolved hospital problems. *      Consults:  IP CONSULT TO GI  IP CONSULT TO HEM/ONC    Procedures: ***    Hospital Course: ***    Discharge Exam:  Vitals:    03/22/24 1545 03/22/24 1930 03/23/24 0900 03/23/24 1130   BP: (!) 159/79 (!) 145/91 (!) 168/90 (!) 158/68   Pulse: 83 (!) 104 (!) 114 72   Resp: 18 20 20    Temp: 98.3 °F (36.8 °C) 98.3 °F (36.8 °C) 98.8 °F (37.1 °C)   List        CONTINUE these medications which have NOT CHANGED    Details   acetaminophen (TYLENOL) 500 MG tablet Take 2 tablets by mouth every 6 hours as needed for Pain           STOP taking these medications       famotidine (PEPCID) 20 MG tablet Comments:   Reason for Stopping:                 Note  that  31  minutes were spent in preparing discharge papers, discussing discharge with patient, medication review, etc.    NOTE: This report was transcribed using voice recognition software. Every effort was made to ensure accuracy; however, inadvertent computerized transcription errors may be present.     Signed:  Electronically signed by Jerome Stallworth MD on 3/23/2024 at 2:30 PM

## 2024-03-24 LAB
HSV I/II AB, IGM: 1.31 IV
HSV I/II IGG: 0.73 IV

## 2024-03-25 LAB — EBV VCA IGG SER-ACNC: 408 U/ML

## 2024-03-26 LAB — MITOCHONDRIA M2 IGG SER-ACNC: 1.7 U/ML (ref 0–4)

## 2024-03-27 LAB
C282Y HEMOCHROMATOSIS MUT: NEGATIVE
H63D HEMOCHROMATOSIS MUT: NEGATIVE
HEMOCHROMATOSIS MUTATION INT: NORMAL
HEMOCHROMATOSIS SPECIMEN: NORMAL
S65C HEMOCHROMATOSIS MUT: NEGATIVE
SMA IGG SER-ACNC: NEGATIVE

## 2024-03-28 LAB — MITOCHONDRIA AB SER QL: NEGATIVE

## 2024-04-02 ENCOUNTER — OFFICE VISIT (OUTPATIENT)
Dept: FAMILY MEDICINE CLINIC | Age: 34
End: 2024-04-02
Payer: COMMERCIAL

## 2024-04-02 VITALS
RESPIRATION RATE: 16 BRPM | HEART RATE: 113 BPM | WEIGHT: 164 LBS | TEMPERATURE: 97.2 F | BODY MASS INDEX: 23.48 KG/M2 | DIASTOLIC BLOOD PRESSURE: 60 MMHG | OXYGEN SATURATION: 98 % | HEIGHT: 70 IN | SYSTOLIC BLOOD PRESSURE: 138 MMHG

## 2024-04-02 DIAGNOSIS — R09.82 POST-NASAL DRIP: ICD-10-CM

## 2024-04-02 DIAGNOSIS — E55.9 VITAMIN D DEFICIENCY: ICD-10-CM

## 2024-04-02 DIAGNOSIS — R74.01 TRANSAMINITIS: ICD-10-CM

## 2024-04-02 DIAGNOSIS — R59.0 LAD (LYMPHADENOPATHY) OF RIGHT CERVICAL REGION: ICD-10-CM

## 2024-04-02 DIAGNOSIS — B27.90 EPSTEIN BARR INFECTION: ICD-10-CM

## 2024-04-02 DIAGNOSIS — Z09 HOSPITAL DISCHARGE FOLLOW-UP: Primary | ICD-10-CM

## 2024-04-02 DIAGNOSIS — R79.89 ELEVATED LFTS: ICD-10-CM

## 2024-04-02 PROCEDURE — 1111F DSCHRG MED/CURRENT MED MERGE: CPT | Performed by: NURSE PRACTITIONER

## 2024-04-02 PROCEDURE — 99214 OFFICE O/P EST MOD 30 MIN: CPT | Performed by: NURSE PRACTITIONER

## 2024-04-02 SDOH — ECONOMIC STABILITY: INCOME INSECURITY: HOW HARD IS IT FOR YOU TO PAY FOR THE VERY BASICS LIKE FOOD, HOUSING, MEDICAL CARE, AND HEATING?: NOT HARD AT ALL

## 2024-04-02 SDOH — ECONOMIC STABILITY: FOOD INSECURITY: WITHIN THE PAST 12 MONTHS, THE FOOD YOU BOUGHT JUST DIDN'T LAST AND YOU DIDN'T HAVE MONEY TO GET MORE.: NEVER TRUE

## 2024-04-02 SDOH — ECONOMIC STABILITY: FOOD INSECURITY: WITHIN THE PAST 12 MONTHS, YOU WORRIED THAT YOUR FOOD WOULD RUN OUT BEFORE YOU GOT MONEY TO BUY MORE.: NEVER TRUE

## 2024-04-02 ASSESSMENT — ENCOUNTER SYMPTOMS
SHORTNESS OF BREATH: 0
SINUS PAIN: 0
WHEEZING: 0
COLOR CHANGE: 0
COUGH: 1
VOMITING: 0
FACIAL SWELLING: 0
VOICE CHANGE: 0
TROUBLE SWALLOWING: 0
NAUSEA: 0
ABDOMINAL PAIN: 0
SINUS PRESSURE: 0
CONSTIPATION: 0
DIARRHEA: 0
SORE THROAT: 1
RHINORRHEA: 0

## 2024-04-02 NOTE — PROGRESS NOTES
Neurological:      General: No focal deficit present.      Mental Status: He is alert and oriented to person, place, and time. Mental status is at baseline.   Psychiatric:         Mood and Affect: Mood normal.         Behavior: Behavior normal.         Thought Content: Thought content normal.         Judgment: Judgment normal.         An electronic signature was used to authenticate this note.  --Madison Maurice, FREEMAN - CNP

## 2024-04-03 PROBLEM — R09.82 POST-NASAL DRIP: Status: ACTIVE | Noted: 2024-04-03

## 2024-04-03 LAB — VITAMIN D 25-HYDROXY: 32.1 NG/ML (ref 30–100)

## 2024-04-03 NOTE — ASSESSMENT & PLAN NOTE
New discussed can take 4 - 6 weeks to resolve but could have long term fatigue. Continue vitamins check vitamin D levels to be sure dose is okay.

## 2024-04-03 NOTE — ASSESSMENT & PLAN NOTE
Discussed using plain mucinex for the drainage and salt water rinses, he has been having some bleeding from the nose so start nasogel 2 - 3 times a day to moisten the mucus membranes and hold the irrigations. Continue humidifier.

## 2024-04-03 NOTE — ASSESSMENT & PLAN NOTE
Improving discussed today not to be rubbing the areas and limit manipulation as this can increase the reactivity of the lymph nodes. Does have follow up with Dr. Grier 4/9/24

## 2024-04-09 ENCOUNTER — OFFICE VISIT (OUTPATIENT)
Dept: ONCOLOGY | Age: 34
End: 2024-04-09
Payer: COMMERCIAL

## 2024-04-09 ENCOUNTER — HOSPITAL ENCOUNTER (OUTPATIENT)
Dept: INFUSION THERAPY | Age: 34
Discharge: HOME | End: 2024-04-09

## 2024-04-09 VITALS
WEIGHT: 163.5 LBS | SYSTOLIC BLOOD PRESSURE: 152 MMHG | HEART RATE: 120 BPM | HEIGHT: 70 IN | DIASTOLIC BLOOD PRESSURE: 83 MMHG | BODY MASS INDEX: 23.41 KG/M2 | TEMPERATURE: 97.4 F | OXYGEN SATURATION: 100 %

## 2024-04-09 DIAGNOSIS — D72.829 LEUKOCYTOSIS, UNSPECIFIED TYPE: Primary | ICD-10-CM

## 2024-04-09 PROCEDURE — 99214 OFFICE O/P EST MOD 30 MIN: CPT

## 2024-04-09 NOTE — PROGRESS NOTES
[facility]  Hematology/Oncology  Consult  Dr. Rambo Grier      Patient Name: Jerzy Roblero  YOB: 1990  PCP: Madison Maurice, APRN - CNP   Referring Provider:      Reason for Consultation:   Chief Complaint   Patient presents with    New Patient     LEUKOCYTOSIS        Subjective:  Feels improved since DC. No further LAD, more energy. No B symptoms    History of Present Illness:  This pt is a pleasant 34 yo male who presented to the ER with cough and fever over 2 weeks. Reportedly had negative covid and flu testing with PCP. He ended up presenting to ER for persistent symptoms and was diagnosed with flu B. CMP with normal renal function. LFTs were elevated but have improved today. T bili has remain normal. CT N/C/A/P as below, with mild splenomegaly and mild axillary, mediastinal and cervical LAD. CBC today with WBC 12.2, H+H and platelets WNL. ANC WNL. ALC elevated at 6.7. Myelocyte absolute was 0.21. Oncology consulted due to the metamyelocytes and myelocytes on differential and LAD above the diaphragm     Diagnostic Data:     Past Medical History:   Diagnosis Date    Allergic rhinitis     Asthma, exercise induced 2000    COVID 01/2022    High risk heterosexual behavior 4/20/2022    Nonintractable headache 3/11/2024    Osteoarthritis     Sacrococcygeal pilonidal cyst 8/19/2019    Tick bite 5/9/2022       Patient Active Problem List    Diagnosis Date Noted    Post-nasal drip 04/03/2024    Hospital discharge follow-up 04/02/2024    Subhash Nolasco infection 04/02/2024    Influenza B 03/21/2024    Elevated LFTs 03/21/2024    LAD (lymphadenopathy) of right cervical region 03/21/2024    Alkaline phosphatase elevation 03/21/2024    Transaminitis 03/21/2024    Tachycardia 03/21/2024    Fever 03/11/2024    Nonintractable headache 03/11/2024    Viral URI 03/11/2024    Food allergy 12/18/2023    Black tarry stools 03/23/2023    Gastroesophageal reflux disease with esophagitis without

## 2024-04-16 DIAGNOSIS — D72.829 LEUKOCYTOSIS, UNSPECIFIED TYPE: ICD-10-CM

## 2024-04-29 ENCOUNTER — HOSPITAL ENCOUNTER (OUTPATIENT)
Dept: CT IMAGING | Age: 34
Discharge: HOME | End: 2024-05-01
Attending: INTERNAL MEDICINE
Payer: COMMERCIAL

## 2024-04-29 DIAGNOSIS — D72.829 LEUKOCYTOSIS, UNSPECIFIED TYPE: ICD-10-CM

## 2024-04-29 PROCEDURE — 74177 CT ABD & PELVIS W/CONTRAST: CPT

## 2024-04-29 PROCEDURE — 71260 CT THORAX DX C+: CPT

## 2024-04-29 PROCEDURE — 6360000004 HC RX CONTRAST MEDICATION: Performed by: RADIOLOGY

## 2024-04-29 PROCEDURE — 70492 CT SFT TSUE NCK W/O & W/DYE: CPT

## 2024-04-29 RX ADMIN — IOPAMIDOL 75 ML: 755 INJECTION, SOLUTION INTRAVENOUS at 09:30

## 2024-05-02 PROBLEM — Z09 HOSPITAL DISCHARGE FOLLOW-UP: Status: RESOLVED | Noted: 2024-04-02 | Resolved: 2024-05-02

## 2024-05-04 ENCOUNTER — HOSPITAL ENCOUNTER (OUTPATIENT)
Age: 34
Discharge: HOME | End: 2024-05-04
Payer: COMMERCIAL

## 2024-05-04 LAB
ALBUMIN SERPL-MCNC: 4.6 G/DL (ref 3.5–5.2)
ALP SERPL-CCNC: 66 U/L (ref 40–129)
ALT SERPL-CCNC: 35 U/L (ref 0–40)
ANION GAP SERPL CALCULATED.3IONS-SCNC: 8 MMOL/L (ref 7–16)
AST SERPL-CCNC: 20 U/L (ref 0–39)
BASOPHILS # BLD: 0.03 K/UL (ref 0–0.2)
BASOPHILS NFR BLD: 1 % (ref 0–2)
BILIRUB SERPL-MCNC: 0.4 MG/DL (ref 0–1.2)
BUN SERPL-MCNC: 14 MG/DL (ref 6–20)
CALCIUM SERPL-MCNC: 9.8 MG/DL (ref 8.6–10.2)
CHLORIDE SERPL-SCNC: 103 MMOL/L (ref 98–107)
CO2 SERPL-SCNC: 26 MMOL/L (ref 22–29)
CREAT SERPL-MCNC: 0.8 MG/DL (ref 0.7–1.2)
EOSINOPHIL # BLD: 0.07 K/UL (ref 0.05–0.5)
EOSINOPHILS RELATIVE PERCENT: 1 % (ref 0–6)
ERYTHROCYTE [DISTWIDTH] IN BLOOD BY AUTOMATED COUNT: 12.2 % (ref 11.5–15)
GFR, ESTIMATED: >90 ML/MIN/1.73M2
GLUCOSE SERPL-MCNC: 100 MG/DL (ref 74–99)
HCT VFR BLD AUTO: 44.6 % (ref 37–54)
HGB BLD-MCNC: 15.1 G/DL (ref 12.5–16.5)
IMM GRANULOCYTES # BLD AUTO: <0.03 K/UL (ref 0–0.58)
IMM GRANULOCYTES NFR BLD: 0 % (ref 0–5)
LYMPHOCYTES NFR BLD: 2.61 K/UL (ref 1.5–4)
LYMPHOCYTES RELATIVE PERCENT: 49 % (ref 20–42)
MCH RBC QN AUTO: 26.6 PG (ref 26–35)
MCHC RBC AUTO-ENTMCNC: 33.9 G/DL (ref 32–34.5)
MCV RBC AUTO: 78.5 FL (ref 80–99.9)
MONOCYTES NFR BLD: 0.4 K/UL (ref 0.1–0.95)
MONOCYTES NFR BLD: 8 % (ref 2–12)
NEUTROPHILS NFR BLD: 41 % (ref 43–80)
NEUTS SEG NFR BLD: 2.22 K/UL (ref 1.8–7.3)
PLATELET # BLD AUTO: 206 K/UL (ref 130–450)
PMV BLD AUTO: 9.1 FL (ref 7–12)
POTASSIUM SERPL-SCNC: 4.8 MMOL/L (ref 3.5–5)
PROT SERPL-MCNC: 7.1 G/DL (ref 6.4–8.3)
RBC # BLD AUTO: 5.68 M/UL (ref 3.8–5.8)
SODIUM SERPL-SCNC: 137 MMOL/L (ref 132–146)
WBC OTHER # BLD: 5.4 K/UL (ref 4.5–11.5)

## 2024-05-04 PROCEDURE — 36415 COLL VENOUS BLD VENIPUNCTURE: CPT

## 2024-05-04 PROCEDURE — 80053 COMPREHEN METABOLIC PANEL: CPT

## 2024-05-04 PROCEDURE — 85025 COMPLETE CBC W/AUTO DIFF WBC: CPT

## 2024-05-07 ENCOUNTER — OFFICE VISIT (OUTPATIENT)
Dept: ONCOLOGY | Age: 34
End: 2024-05-07
Payer: COMMERCIAL

## 2024-05-07 VITALS
WEIGHT: 165.9 LBS | TEMPERATURE: 97.6 F | HEIGHT: 70 IN | BODY MASS INDEX: 23.75 KG/M2 | SYSTOLIC BLOOD PRESSURE: 161 MMHG | OXYGEN SATURATION: 100 % | HEART RATE: 115 BPM | DIASTOLIC BLOOD PRESSURE: 78 MMHG

## 2024-05-07 DIAGNOSIS — D72.829 LEUKOCYTOSIS, UNSPECIFIED TYPE: Primary | ICD-10-CM

## 2024-05-07 PROCEDURE — 99212 OFFICE O/P EST SF 10 MIN: CPT

## 2024-05-07 NOTE — PROGRESS NOTES
[facility]  Hematology/Oncology  Consult  Dr. Rambo Grier      Patient Name: Jerzy Roblero  YOB: 1990  PCP: Madison Maurice APRN - CNP   Referring Provider:      Reason for Consultation:   Chief Complaint   Patient presents with    Follow-up     Leukocytosis, unspecified type        Subjective:  Feels continued improvement. No further LAD, more energy. No B symptoms. No new issues    History of Present Illness:  This pt is a pleasant 32 yo male who presented to the ER with cough and fever over 2 weeks. Reportedly had negative covid and flu testing with PCP. He ended up presenting to ER for persistent symptoms and was diagnosed with flu B. CMP with normal renal function. LFTs were elevated but have improved today. T bili has remain normal. CT N/C/A/P as below, with mild splenomegaly and mild axillary, mediastinal and cervical LAD. CBC today with WBC 12.2, H+H and platelets WNL. ANC WNL. ALC elevated at 6.7. Myelocyte absolute was 0.21. Oncology consulted due to the metamyelocytes and myelocytes on differential and LAD above the diaphragm     Diagnostic Data:     Past Medical History:   Diagnosis Date    Allergic rhinitis     Asthma, exercise induced 2000    COVID 01/2022    High risk heterosexual behavior 4/20/2022    Nonintractable headache 3/11/2024    Osteoarthritis     Sacrococcygeal pilonidal cyst 8/19/2019    Tick bite 5/9/2022       Patient Active Problem List    Diagnosis Date Noted    Post-nasal drip 04/03/2024    Subhash Nolasco infection 04/02/2024    Influenza B 03/21/2024    Elevated LFTs 03/21/2024    LAD (lymphadenopathy) of right cervical region 03/21/2024    Alkaline phosphatase elevation 03/21/2024    Transaminitis 03/21/2024    Tachycardia 03/21/2024    Fever 03/11/2024    Nonintractable headache 03/11/2024    Viral URI 03/11/2024    Food allergy 12/18/2023    Black tarry stools 03/23/2023    Gastroesophageal reflux disease with esophagitis without hemorrhage

## 2024-08-28 ENCOUNTER — OFFICE VISIT (OUTPATIENT)
Dept: FAMILY MEDICINE CLINIC | Age: 34
End: 2024-08-28
Payer: COMMERCIAL

## 2024-08-28 VITALS
TEMPERATURE: 97.4 F | DIASTOLIC BLOOD PRESSURE: 83 MMHG | OXYGEN SATURATION: 99 % | WEIGHT: 165 LBS | SYSTOLIC BLOOD PRESSURE: 158 MMHG | BODY MASS INDEX: 23.62 KG/M2 | RESPIRATION RATE: 19 BRPM | HEIGHT: 70 IN | HEART RATE: 100 BPM

## 2024-08-28 DIAGNOSIS — J06.9 VIRAL URI: Primary | ICD-10-CM

## 2024-08-28 PROCEDURE — 99213 OFFICE O/P EST LOW 20 MIN: CPT

## 2024-08-28 NOTE — PROGRESS NOTES
Height: 1.778 m (5' 10\")       Physical Exam (PE)    Physical Exam  Vitals and nursing note reviewed.   Constitutional:       Appearance: He is well-developed.   HENT:      Head: Normocephalic and atraumatic.      Right Ear: Hearing and external ear normal. A middle ear effusion is present.      Left Ear: Hearing and external ear normal. A middle ear effusion is present.      Nose: Congestion and rhinorrhea present.   Eyes:      Pupils: Pupils are equal, round, and reactive to light.   Cardiovascular:      Rate and Rhythm: Normal rate and regular rhythm.      Heart sounds: Normal heart sounds. No murmur heard.  Pulmonary:      Effort: Pulmonary effort is normal. No respiratory distress.      Breath sounds: Normal breath sounds. No wheezing or rales.   Abdominal:      General: Bowel sounds are normal.      Palpations: Abdomen is soft.      Tenderness: There is no abdominal tenderness. There is no guarding or rebound.   Musculoskeletal:      Cervical back: Normal range of motion and neck supple.   Skin:     General: Skin is warm and dry.   Neurological:      Mental Status: He is alert and oriented to person, place, and time.      Cranial Nerves: No cranial nerve deficit.      Coordination: Coordination normal.          Testing:   (All laboratory and radiology results have been personally reviewed by myself)  Labs:  No results found for this visit on 08/28/24.    Imaging:  All Radiology results interpreted by Radiologist unless otherwise noted.  No orders to display       Assessment / Plan:   The patient's vitals, allergies, medications, and past medical history have been reviewed.  Jerzy was seen today for sinus problem.    Diagnoses and all orders for this visit:    Viral URI        - Disposition: Home    - Educational material printed for patient's review and were included in patient instructions. After Visit Summary was given to patient at the end of visit.    - COVID-19 swab obtained and negative, Influenza swab  obtained and negative.  Encouraged oral fluids and rest. Discussed symptomatic treatments with patient today. The patient is to schedule a follow-up with PCP in the next 2-3 days for reevaluation. Red flag symptoms were also discussed with the patient today. If symptoms worsen the patient is to go directly to the emergency department for reevaluation and treatment. Pt verbalizes understanding and is in agreement with plan of care. All questions answered.    SIGNATURE: FREEMAN Goode CNP      *NOTE: This report was transcribed using voice recognition software. Every effort was made to ensure accuracy; however, inadvertent computerized transcription errors may be present.

## 2024-09-03 ENCOUNTER — OFFICE VISIT (OUTPATIENT)
Dept: FAMILY MEDICINE CLINIC | Age: 34
End: 2024-09-03
Payer: COMMERCIAL

## 2024-09-03 VITALS
HEART RATE: 107 BPM | TEMPERATURE: 98.2 F | RESPIRATION RATE: 16 BRPM | SYSTOLIC BLOOD PRESSURE: 126 MMHG | DIASTOLIC BLOOD PRESSURE: 72 MMHG | HEIGHT: 70 IN | WEIGHT: 167.5 LBS | OXYGEN SATURATION: 98 % | BODY MASS INDEX: 23.98 KG/M2

## 2024-09-03 DIAGNOSIS — R68.89 FLU-LIKE SYMPTOMS: Primary | ICD-10-CM

## 2024-09-03 DIAGNOSIS — R68.89 FLU-LIKE SYMPTOMS: ICD-10-CM

## 2024-09-03 DIAGNOSIS — B27.90 EPSTEIN BARR INFECTION: ICD-10-CM

## 2024-09-03 DIAGNOSIS — R79.89 ELEVATED LFTS: ICD-10-CM

## 2024-09-03 LAB
ALBUMIN: 4.5 G/DL (ref 3.5–5.2)
ALP BLD-CCNC: 88 U/L (ref 40–129)
ALT SERPL-CCNC: 67 U/L (ref 0–40)
ANION GAP SERPL CALCULATED.3IONS-SCNC: 13 MMOL/L (ref 7–16)
AST SERPL-CCNC: 38 U/L (ref 0–39)
BASOPHILS ABSOLUTE: 0.06 K/UL (ref 0–0.2)
BASOPHILS RELATIVE PERCENT: 1 % (ref 0–2)
BILIRUB SERPL-MCNC: 0.7 MG/DL (ref 0–1.2)
BUN BLDV-MCNC: 14 MG/DL (ref 6–20)
CALCIUM SERPL-MCNC: 9.8 MG/DL (ref 8.6–10.2)
CHLORIDE BLD-SCNC: 99 MMOL/L (ref 98–107)
CO2: 26 MMOL/L (ref 22–29)
CREAT SERPL-MCNC: 0.9 MG/DL (ref 0.7–1.2)
EOSINOPHILS ABSOLUTE: 0.34 K/UL (ref 0.05–0.5)
EOSINOPHILS RELATIVE PERCENT: 5 % (ref 0–6)
GFR, ESTIMATED: >90 ML/MIN/1.73M2
GLUCOSE BLD-MCNC: 102 MG/DL (ref 74–99)
HCT VFR BLD CALC: 47 % (ref 37–54)
HEMOGLOBIN: 15.5 G/DL (ref 12.5–16.5)
HETEROPHILE ANTIBODIES: NEGATIVE
INFLUENZA A ANTIGEN, POC: NEGATIVE
INFLUENZA B ANTIGEN, POC: NEGATIVE
LOT EXPIRE DATE: NORMAL
LOT KIT NUMBER: NORMAL
LYMPHOCYTES ABSOLUTE: 2.88 K/UL (ref 1.5–4)
LYMPHOCYTES RELATIVE PERCENT: 44 % (ref 20–42)
MCH RBC QN AUTO: 26.8 PG (ref 26–35)
MCHC RBC AUTO-ENTMCNC: 33 G/DL (ref 32–34.5)
MCV RBC AUTO: 81.2 FL (ref 80–99.9)
METAMYELOCYTES ABSOLUTE COUNT: 0.17 K/UL (ref 0–0.12)
METAMYELOCYTES: 3 % (ref 0–1)
MONOCYTES ABSOLUTE: 0.45 K/UL (ref 0.1–0.95)
MONOCYTES RELATIVE PERCENT: 7 % (ref 2–12)
NEUTROPHILS ABSOLUTE: 2.6 K/UL (ref 1.8–7.3)
NEUTROPHILS RELATIVE PERCENT: 40 % (ref 43–80)
PDW BLD-RTO: 13.4 % (ref 11.5–15)
PLATELET # BLD: 192 K/UL (ref 130–450)
PMV BLD AUTO: 10.4 FL (ref 7–12)
POTASSIUM SERPL-SCNC: 4.9 MMOL/L (ref 3.5–5)
RBC # BLD: 5.79 M/UL (ref 3.8–5.8)
RBC # BLD: ABNORMAL 10*6/UL
SARS-COV-2, POC: NORMAL
SODIUM BLD-SCNC: 138 MMOL/L (ref 132–146)
TOTAL PROTEIN: 7.7 G/DL (ref 6.4–8.3)
VALID INTERNAL CONTROL: NORMAL
VENDOR AND KIT NAME POC: NORMAL
WBC # BLD: 6.5 K/UL (ref 4.5–11.5)

## 2024-09-03 PROCEDURE — 87428 SARSCOV & INF VIR A&B AG IA: CPT | Performed by: NURSE PRACTITIONER

## 2024-09-03 PROCEDURE — 86308 HETEROPHILE ANTIBODY SCREEN: CPT | Performed by: NURSE PRACTITIONER

## 2024-09-03 PROCEDURE — 99214 OFFICE O/P EST MOD 30 MIN: CPT | Performed by: NURSE PRACTITIONER

## 2024-09-03 ASSESSMENT — ENCOUNTER SYMPTOMS
COUGH: 1
WHEEZING: 0
SHORTNESS OF BREATH: 0

## 2024-09-03 NOTE — PROGRESS NOTES
OFFICE PROGRESS NOTE  [facility]  [address] [place] [address]  Dept: [phone]   Chief Complaint   Patient presents with    Fever    Fatigue    Cough     Pt states having dry cough for about 9 days.    Drainage     Pt states having bloody drainage through nasal passages.     Headache       ASSESSMENT/PLAN   1. Flu-like symptoms  Assessment & Plan:  Negative Flu A/B, Covid. Discussed may be a flare of the EBV infection, noted rising ALT, CBC looks viral await titers. No alcohol and recommend rest   Orders:  -     POCT COVID-19 & Influenza A/B  -     POCT Infectious mononucleosis Abs (mono)  -     Comprehensive Metabolic Panel; Future  -     CBC with Auto Differential; Future  -     Subhash Barr Virus (EBV) Antibody Panel I; Future  2. Elevated LFTs  Assessment & Plan:  Discussed may be a flare of the EBV infection, noted rising ALT, CBC looks viral await titers. No alcohol and recommend rest repeat lab in a week.   Orders:  -     Comprehensive Metabolic Panel; Future  3. Subhash Nolasco infection  Assessment & Plan:  Discussed may be a flare of the EBV infection, noted rising ALT, CBC looks viral await titers. No alcohol and recommend rest        Reviewed labs: CMP, CBCD, looks viral with slightly elevated ALT did have EBV in the spring and may be a flare up.   Reviewed notes from   Reviewed radiology     Discussed taking medications as directed and adverse effects    Return in about 1 month (around 10/3/2024) for follow up .     HPI:   Here today for follow up from [facility] 8/28/24 he has been having a fever 101.5 over the weekend, fatigue, dry cough, bloody drainage from the nose. He coughs and feels upper chest discomfort. He complains of intermittent RUQ pain He was seen at [facility] in and was tested for Covid and influenza but no results in the chart recorded. He does admit to his urine a little darker than normal and he has been hydrating well. He did

## 2024-09-04 ENCOUNTER — TELEPHONE (OUTPATIENT)
Dept: FAMILY MEDICINE CLINIC | Age: 34
End: 2024-09-04

## 2024-09-04 ENCOUNTER — PATIENT MESSAGE (OUTPATIENT)
Dept: FAMILY MEDICINE CLINIC | Age: 34
End: 2024-09-04

## 2024-09-04 PROBLEM — R68.89 FLU-LIKE SYMPTOMS: Status: ACTIVE | Noted: 2024-09-04

## 2024-09-04 PROBLEM — R74.8 ALKALINE PHOSPHATASE ELEVATION: Status: RESOLVED | Noted: 2024-03-21 | Resolved: 2024-09-04

## 2024-09-04 PROBLEM — K92.1 BLACK TARRY STOOLS: Status: RESOLVED | Noted: 2023-03-23 | Resolved: 2024-09-04

## 2024-09-04 NOTE — ASSESSMENT & PLAN NOTE
Discussed may be a flare of the EBV infection, noted rising ALT, CBC looks viral await titers. No alcohol and recommend rest repeat lab in a week.

## 2024-09-04 NOTE — ASSESSMENT & PLAN NOTE
Negative Flu A/B, Covid. Discussed may be a flare of the EBV infection, noted rising ALT, CBC looks viral await titers. No alcohol and recommend rest

## 2024-09-04 NOTE — ASSESSMENT & PLAN NOTE
Discussed may be a flare of the EBV infection, noted rising ALT, CBC looks viral await titers. No alcohol and recommend rest

## 2024-09-04 NOTE — TELEPHONE ENCOUNTER
Pt was informed through my chart about new blood work order expected to be completed by 9/11/2024.

## 2024-09-09 LAB
EBV EARLY ANTIGEN IGG: 73 U/ML
EBV INTERPRETATION: ABNORMAL
EBV NUCLEAR AG AB: 47 U/ML
EPSTEIN-BARR VCA IGG: 880 U/ML
EPSTEIN-BARR VCA IGM: 189 U/ML

## 2024-09-10 DIAGNOSIS — R79.89 ELEVATED LFTS: ICD-10-CM

## 2024-09-10 LAB
ALBUMIN: 4.3 G/DL (ref 3.5–5.2)
ALP BLD-CCNC: 87 U/L (ref 40–129)
ALT SERPL-CCNC: 53 U/L (ref 0–40)
ANION GAP SERPL CALCULATED.3IONS-SCNC: 13 MMOL/L (ref 7–16)
AST SERPL-CCNC: 36 U/L (ref 0–39)
BILIRUB SERPL-MCNC: 0.5 MG/DL (ref 0–1.2)
BUN BLDV-MCNC: 15 MG/DL (ref 6–20)
CALCIUM SERPL-MCNC: 9.5 MG/DL (ref 8.6–10.2)
CHLORIDE BLD-SCNC: 98 MMOL/L (ref 98–107)
CO2: 24 MMOL/L (ref 22–29)
CREAT SERPL-MCNC: 0.9 MG/DL (ref 0.7–1.2)
GFR, ESTIMATED: >90 ML/MIN/1.73M2
GLUCOSE BLD-MCNC: 102 MG/DL (ref 74–99)
POTASSIUM SERPL-SCNC: 4.7 MMOL/L (ref 3.5–5)
SODIUM BLD-SCNC: 135 MMOL/L (ref 132–146)
TOTAL PROTEIN: 7.3 G/DL (ref 6.4–8.3)

## 2024-10-07 ENCOUNTER — OFFICE VISIT (OUTPATIENT)
Dept: FAMILY MEDICINE CLINIC | Age: 34
End: 2024-10-07
Payer: COMMERCIAL

## 2024-10-07 VITALS
BODY MASS INDEX: 23.65 KG/M2 | DIASTOLIC BLOOD PRESSURE: 54 MMHG | RESPIRATION RATE: 20 BRPM | HEIGHT: 70 IN | TEMPERATURE: 98.2 F | WEIGHT: 165.2 LBS | HEART RATE: 95 BPM | OXYGEN SATURATION: 98 % | SYSTOLIC BLOOD PRESSURE: 132 MMHG

## 2024-10-07 DIAGNOSIS — B27.90 EPSTEIN BARR INFECTION: Primary | ICD-10-CM

## 2024-10-07 DIAGNOSIS — N50.811 PAIN IN BOTH TESTICLES: ICD-10-CM

## 2024-10-07 DIAGNOSIS — R79.89 ELEVATED LFTS: ICD-10-CM

## 2024-10-07 DIAGNOSIS — N50.812 PAIN IN BOTH TESTICLES: ICD-10-CM

## 2024-10-07 PROBLEM — R68.89 FLU-LIKE SYMPTOMS: Status: RESOLVED | Noted: 2024-09-04 | Resolved: 2024-10-07

## 2024-10-07 PROBLEM — R59.0 LAD (LYMPHADENOPATHY) OF RIGHT CERVICAL REGION: Status: RESOLVED | Noted: 2024-03-21 | Resolved: 2024-10-07

## 2024-10-07 PROBLEM — J06.9 VIRAL URI: Status: RESOLVED | Noted: 2024-03-11 | Resolved: 2024-10-07

## 2024-10-07 PROBLEM — R09.A2 GLOBUS SENSATION: Status: RESOLVED | Noted: 2019-08-19 | Resolved: 2024-10-07

## 2024-10-07 PROBLEM — J10.1 INFLUENZA B: Status: RESOLVED | Noted: 2024-03-21 | Resolved: 2024-10-07

## 2024-10-07 PROCEDURE — 99214 OFFICE O/P EST MOD 30 MIN: CPT | Performed by: NURSE PRACTITIONER

## 2024-10-07 ASSESSMENT — ENCOUNTER SYMPTOMS
COUGH: 1
WHEEZING: 0
SHORTNESS OF BREATH: 1

## 2024-10-07 NOTE — PROGRESS NOTES
OFFICE PROGRESS NOTE  [facility]  [address]  [place] [address]  Dept: [phone]   Chief Complaint   Patient presents with    Follow-up    Testicle Pain     Pt c/o testicular pain for about 3 weeks.     Flu Vaccine     Will get at work       ASSESSMENT/PLAN   1. Subhash Nolasco infection  Assessment & Plan:  Improving slowly still feels fatigue repeat liver enzymes  Orders:  -     Comprehensive Metabolic Panel; Future  -     CBC with Auto Differential; Future  2. Elevated LFTs  Assessment & Plan:  Improving check LFT's in a month  Orders:  -     Comprehensive Metabolic Panel; Future  3. Pain in both testicles  Assessment & Plan:  No abnormality noted on exam today admits his baby has kicked him a lot recommend wearing a cup.  If persists testicular US.        Reviewed labs: CMP,  Reviewed notes from   Reviewed radiology         Return in about 3 months (around 1/7/2025), or if symptoms worsen or fail to improve, for [facility] Dr ASHLEY Rodriguez.     HPI:   Here today for follow up on recent EBV he is feeling better but is still very fatigued, his cough is improving but still has it in the mornings and sometimes throughout the day. Appetite has improved.     He is complaining of left testicular pain x 3 - 4 weeks mostly but then the right side 2 - 3 days ago. He doesn't feel anything abnormal. He says it can be there all day then other times doesn't notice it. His baby does kick him in the testicles a lot.     Current Outpatient Medications:     budesonide-formoterol (SYMBICORT) 160-4.5 MCG/ACT AERO, Inhale 2 puffs into the lungs in the morning and 2 puffs in the evening. (Patient not taking: Reported on 10/7/2024), Disp: 10.2 g, Rfl: 0    sodium chloride (OCEAN, BABY AYR) 0.65 % nasal spray, 1 spray by Nasal route every 2 hours as needed for Congestion (Patient not taking: Reported on 10/7/2024), Disp: 1 each, Rfl: 0    albuterol sulfate HFA (VENTOLIN HFA) 108 (90

## 2024-10-07 NOTE — ASSESSMENT & PLAN NOTE
No abnormality noted on exam today admits his baby has kicked him a lot recommend wearing a cup.  If persists testicular US.

## 2024-12-04 ENCOUNTER — OFFICE VISIT (OUTPATIENT)
Dept: FAMILY MEDICINE CLINIC | Age: 34
End: 2024-12-04

## 2024-12-04 VITALS
BODY MASS INDEX: 24.05 KG/M2 | HEART RATE: 101 BPM | DIASTOLIC BLOOD PRESSURE: 62 MMHG | WEIGHT: 168 LBS | HEIGHT: 70 IN | OXYGEN SATURATION: 98 % | RESPIRATION RATE: 20 BRPM | SYSTOLIC BLOOD PRESSURE: 140 MMHG | TEMPERATURE: 98.5 F

## 2024-12-04 DIAGNOSIS — R68.89 FLU-LIKE SYMPTOMS: Primary | ICD-10-CM

## 2024-12-04 DIAGNOSIS — J10.1 INFLUENZA A: ICD-10-CM

## 2024-12-04 DIAGNOSIS — J01.40 ACUTE NON-RECURRENT PANSINUSITIS: ICD-10-CM

## 2024-12-04 LAB
INFLUENZA A ANTIBODY: POSITIVE
INFLUENZA B ANTIBODY: NEGATIVE
Lab: NORMAL
PERFORMING INSTRUMENT: NORMAL
QC PASS/FAIL: NORMAL
SARS-COV-2, POC: NORMAL

## 2024-12-04 RX ORDER — AZITHROMYCIN 250 MG/1
TABLET, FILM COATED ORAL
Qty: 6 TABLET | Refills: 0 | Status: SHIPPED | OUTPATIENT
Start: 2024-12-04 | End: 2024-12-14

## 2024-12-04 RX ORDER — BENZONATATE 100 MG/1
100 CAPSULE ORAL 3 TIMES DAILY PRN
Qty: 30 CAPSULE | Refills: 0 | Status: SHIPPED | OUTPATIENT
Start: 2024-12-04 | End: 2024-12-14

## 2024-12-04 ASSESSMENT — ENCOUNTER SYMPTOMS
CONSTIPATION: 0
DIARRHEA: 0
ABDOMINAL PAIN: 0
COLOR CHANGE: 0
VOMITING: 0
SINUS PRESSURE: 1
SINUS PAIN: 1
COUGH: 1
WHEEZING: 0
NAUSEA: 0
SORE THROAT: 1
SHORTNESS OF BREATH: 0

## 2024-12-04 NOTE — PROGRESS NOTES
edema  Pulmonary/Chest: clear to auscultation bilaterally, no wheezes, rales or rhonchi, normal air movement, no respiratory distress  Abdomen: soft, non-tender, non-distended, normal bowel sounds, no masses or hepatosplenomegaly  Neurologic: gait, coordination and speech normal  Extremities: no clubbing, cyanosis, or edema.   Psychiatric: Good eye contact, normal mood and affect, answers questions appropriately    I have reviewed my findings and recommendations with Jerzy Roblero.    Madison Maurice, APRN - CNP, NP-C, FNP-BC

## 2024-12-04 NOTE — ASSESSMENT & PLAN NOTE
+ influenza A but too late for antiviral compounded by sinus infection RX azithromycin as directed, continue the salt water rinse. Plain mucinex 600 mg BID, tessalon perles 100 mg TID as needed for cough. Mask up at work

## 2024-12-04 NOTE — ASSESSMENT & PLAN NOTE
+ influenza A but too late for antiviral compounded by sinus infection RX azithromycin as directed, continue the salt water rinse. Plain mucinex 600 mg BID, tessalon perles 100 mg TID as needed for cough

## 2024-12-09 ENCOUNTER — TELEPHONE (OUTPATIENT)
Dept: FAMILY MEDICINE CLINIC | Age: 34
End: 2024-12-09

## 2024-12-09 NOTE — TELEPHONE ENCOUNTER
Wife sees Dr. Rodriguez and inquiring if Dr. Rodriguez would see her  as a new patient .    She can be reached at:  [phone]

## 2024-12-24 ENCOUNTER — HOSPITAL ENCOUNTER (OUTPATIENT)
Dept: ULTRASOUND IMAGING | Age: 34
Discharge: HOME | End: 2024-12-26

## 2024-12-24 DIAGNOSIS — K76.0 FATTY LIVER: ICD-10-CM

## 2024-12-24 DIAGNOSIS — R74.01 TRANSAMINITIS: ICD-10-CM

## 2025-01-03 PROBLEM — J10.1 INFLUENZA A: Status: RESOLVED | Noted: 2024-12-04 | Resolved: 2025-01-03

## 2025-01-07 SDOH — HEALTH STABILITY: PHYSICAL HEALTH: ON AVERAGE, HOW MANY MINUTES DO YOU ENGAGE IN EXERCISE AT THIS LEVEL?: 30 MIN

## 2025-01-07 SDOH — HEALTH STABILITY: PHYSICAL HEALTH: ON AVERAGE, HOW MANY DAYS PER WEEK DO YOU ENGAGE IN MODERATE TO STRENUOUS EXERCISE (LIKE A BRISK WALK)?: 1 DAY

## 2025-01-08 ENCOUNTER — NURSE TRIAGE (OUTPATIENT)
Dept: OTHER | Facility: CLINIC | Age: 35
End: 2025-01-08

## 2025-01-10 ENCOUNTER — OFFICE VISIT (OUTPATIENT)
Dept: PRIMARY CARE CLINIC | Age: 35
End: 2025-01-10
Payer: COMMERCIAL

## 2025-01-10 ENCOUNTER — HOSPITAL ENCOUNTER (OUTPATIENT)
Dept: ULTRASOUND IMAGING | Age: 35
Discharge: HOME | End: 2025-01-12
Payer: COMMERCIAL

## 2025-01-10 VITALS
WEIGHT: 171 LBS | HEART RATE: 87 BPM | HEIGHT: 70 IN | OXYGEN SATURATION: 98 % | DIASTOLIC BLOOD PRESSURE: 66 MMHG | SYSTOLIC BLOOD PRESSURE: 128 MMHG | BODY MASS INDEX: 24.48 KG/M2 | TEMPERATURE: 97.4 F

## 2025-01-10 DIAGNOSIS — J32.9 CHRONIC SINUSITIS, UNSPECIFIED LOCATION: ICD-10-CM

## 2025-01-10 DIAGNOSIS — K76.0 FATTY LIVER: Primary | ICD-10-CM

## 2025-01-10 PROCEDURE — 99213 OFFICE O/P EST LOW 20 MIN: CPT | Performed by: INTERNAL MEDICINE

## 2025-01-10 PROCEDURE — 76981 USE PARENCHYMA: CPT

## 2025-01-10 RX ORDER — MULTIVIT-MIN/IRON/FOLIC ACID/K 18-600-40
2000 CAPSULE ORAL DAILY
COMMUNITY

## 2025-01-10 ASSESSMENT — PATIENT HEALTH QUESTIONNAIRE - PHQ9
SUM OF ALL RESPONSES TO PHQ QUESTIONS 1-9: 0
10. IF YOU CHECKED OFF ANY PROBLEMS, HOW DIFFICULT HAVE THESE PROBLEMS MADE IT FOR YOU TO DO YOUR WORK, TAKE CARE OF THINGS AT HOME, OR GET ALONG WITH OTHER PEOPLE: NOT DIFFICULT AT ALL
3. TROUBLE FALLING OR STAYING ASLEEP: NOT AT ALL
7. TROUBLE CONCENTRATING ON THINGS, SUCH AS READING THE NEWSPAPER OR WATCHING TELEVISION: NOT AT ALL
9. THOUGHTS THAT YOU WOULD BE BETTER OFF DEAD, OR OF HURTING YOURSELF: NOT AT ALL
8. MOVING OR SPEAKING SO SLOWLY THAT OTHER PEOPLE COULD HAVE NOTICED. OR THE OPPOSITE, BEING SO FIGETY OR RESTLESS THAT YOU HAVE BEEN MOVING AROUND A LOT MORE THAN USUAL: NOT AT ALL
SUM OF ALL RESPONSES TO PHQ QUESTIONS 1-9: 0
4. FEELING TIRED OR HAVING LITTLE ENERGY: NOT AT ALL
SUM OF ALL RESPONSES TO PHQ9 QUESTIONS 1 & 2: 0
2. FEELING DOWN, DEPRESSED OR HOPELESS: NOT AT ALL
SUM OF ALL RESPONSES TO PHQ QUESTIONS 1-9: 0
SUM OF ALL RESPONSES TO PHQ QUESTIONS 1-9: 0
5. POOR APPETITE OR OVEREATING: NOT AT ALL
1. LITTLE INTEREST OR PLEASURE IN DOING THINGS: NOT AT ALL
6. FEELING BAD ABOUT YOURSELF - OR THAT YOU ARE A FAILURE OR HAVE LET YOURSELF OR YOUR FAMILY DOWN: NOT AT ALL

## 2025-01-10 ASSESSMENT — ENCOUNTER SYMPTOMS
NAUSEA: 0
DIARRHEA: 0
SHORTNESS OF BREATH: 0
RHINORRHEA: 0
ABDOMINAL PAIN: 0
CHEST TIGHTNESS: 0
VOMITING: 0
SORE THROAT: 0
CONSTIPATION: 0
EYE PAIN: 0
BLOOD IN STOOL: 0
COUGH: 0

## 2025-01-10 NOTE — PROGRESS NOTES
[facility]   Internal Medicine     1/10/2025  Jerzy Roblero : 1990 Sex: male  Age:34 y.o.    Chief Complaint   Patient presents with    New Patient        HPI:   Patient presents to office for evaluation of the following medical concerns.    - [facility] has been having Subhash bar infections for last 1 year starting (3/2024). [facility] has had elevated LFT and lymphadenopathy. [facility] had seen GI and hem/ onc. Was admitted to hospital (3-2024). [facility] had another episode (2024).   - stable at present     - [facility] CT scan suggested fatty liver  - CT abdo (2024) - No acute intrathoracic, intra-abdominal or intrapelvic abnormality. Specifically no bulky adenopathy or splenomegaly may have resolved in the interim.  No acute infectious source etiology. Hepatomegaly with mild hepatic steatosis. Mild-to-moderate colonic stool burden  - has seen GI   - labs (2024) - aguillon fibrosure mild fatty liver, no fibrosis, LFT normal     - [facility] has chronic sinus symptoms. [facility] has had recurrent symptoms. [facility] has seen allergy in the past. [facility] has seen ENT in the past.     Health Maintenance   - immunizations:   Influenza Vaccination  Pneumonia Vaccination  Zoster/Shingles Vaccine  Tetanus Vaccination    - Screenings:   Colonoscopy   Prostate     ROS:  Review of Systems   Constitutional:  Negative for appetite change, chills, fever and unexpected weight change.   HENT:  Negative for congestion, rhinorrhea and sore throat.    Eyes:  Negative for pain and visual disturbance.   Respiratory:  Negative for cough, chest tightness and shortness of breath.    Cardiovascular:  Negative for chest pain and palpitations.   Gastrointestinal:  Negative for abdominal pain, blood in stool, constipation, diarrhea, nausea and vomiting.   Genitourinary:  Negative for difficulty urinating, dysuria, hematuria, scrotal swelling, testicular pain and urgency.   Musculoskeletal:  Negative for arthralgias and gait

## (undated) DEVICE — MARKER,SKIN,WI/RULER AND LABELS: Brand: MEDLINE

## (undated) DEVICE — GAUZE,SPONGE,4"X4",16PLY,STRL,LF,10/TRAY: Brand: MEDLINE

## (undated) DEVICE — PACK,LAPAROTOMY,NO GOWNS: Brand: MEDLINE

## (undated) DEVICE — BANDAGE,GAUZE,4.5"X4.1YD,STERILE,LF: Brand: MEDLINE

## (undated) DEVICE — PAD,ABDOMINAL,5"X9",ST,LF,25/BX: Brand: MEDLINE INDUSTRIES, INC.

## (undated) DEVICE — BLADE ES ELASTOMERIC COAT INSUL DURABLE BEND UPTO 90DEG

## (undated) DEVICE — COVER,LIGHT HANDLE,FLX,1/PK: Brand: MEDLINE INDUSTRIES, INC.

## (undated) DEVICE — SET SURG INSTR DISSECT

## (undated) DEVICE — TOWEL,OR,DSP,ST,BLUE,STD,6/PK,12PK/CS: Brand: MEDLINE

## (undated) DEVICE — GAUZE,SPONGE,4"X4",16PLY,WOVEN,NS,LF: Brand: MEDLINE INDUSTRIES, INC.

## (undated) DEVICE — INTENDED FOR TISSUE SEPARATION, AND OTHER PROCEDURES THAT REQUIRE A SHARP SURGICAL BLADE TO PUNCTURE OR CUT.: Brand: BARD-PARKER ® STAINLESS STEEL BLADES

## (undated) DEVICE — YANKAUER,BULB TIP,W/O VENT,RIGID,STERILE: Brand: MEDLINE

## (undated) DEVICE — DOUBLE BASIN SET: Brand: MEDLINE INDUSTRIES, INC.

## (undated) DEVICE — GAUZE,SPONGE,4"X4",16PLY,XRAY,STRL,LF: Brand: MEDLINE

## (undated) DEVICE — CONTROL SYRINGE LUER-LOCK TIP: Brand: MONOJECT

## (undated) DEVICE — GOWN,SIRUS,NONRNF,SETINSLV,XL,20/CS: Brand: MEDLINE

## (undated) DEVICE — SYRINGE IRRIG 60ML SFT PLIABLE BLB EZ TO GRP 1 HND USE W/

## (undated) DEVICE — ELECTROSURGICAL PENCIL BUTTON SWITCH E-Z CLEAN COATED BLADE ELECTRODE 10 FT (3 M) CORD HOLSTER: Brand: MEGADYNE

## (undated) DEVICE — TUBING, SUCTION, 1/4" X 10', STRAIGHT: Brand: MEDLINE

## (undated) DEVICE — PATIENT RETURN ELECTRODE, SINGLE-USE, CONTACT QUALITY MONITORING, ADULT, WITH 9FT CORD, FOR PATIENTS WEIGING OVER 33LBS. (15KG): Brand: MEGADYNE

## (undated) DEVICE — NDL CNTR 40CT FM MAG: Brand: MEDLINE INDUSTRIES, INC.

## (undated) DEVICE — GLOVE ORANGE PI 7 1/2   MSG9075

## (undated) DEVICE — READY WET SKIN SCRUB TRAY-LF: Brand: MEDLINE INDUSTRIES, INC.

## (undated) DEVICE — SPONGE LAP ST XRAY 12X12